# Patient Record
Sex: FEMALE | Race: BLACK OR AFRICAN AMERICAN | Employment: STUDENT | URBAN - METROPOLITAN AREA
[De-identification: names, ages, dates, MRNs, and addresses within clinical notes are randomized per-mention and may not be internally consistent; named-entity substitution may affect disease eponyms.]

---

## 2021-06-26 ENCOUNTER — HOSPITAL ENCOUNTER (EMERGENCY)
Age: 21
Discharge: HOME | End: 2021-06-26
Attending: EMERGENCY MEDICINE
Payer: COMMERCIAL

## 2021-06-26 VITALS
WEIGHT: 154 LBS | RESPIRATION RATE: 16 BRPM | TEMPERATURE: 97 F | SYSTOLIC BLOOD PRESSURE: 119 MMHG | HEART RATE: 87 BPM | DIASTOLIC BLOOD PRESSURE: 59 MMHG | OXYGEN SATURATION: 99 %

## 2021-06-26 DIAGNOSIS — R11.15 CYCLICAL VOMITING: Primary | ICD-10-CM

## 2021-06-26 PROCEDURE — 96374 THER/PROPH/DIAG INJ IV PUSH: CPT

## 2021-06-26 PROCEDURE — 99284 EMERGENCY DEPT VISIT MOD MDM: CPT

## 2021-06-26 PROCEDURE — 80053 COMPREHEN METABOLIC PANEL: CPT | Performed by: EMERGENCY MEDICINE

## 2021-06-26 PROCEDURE — 85025 COMPLETE CBC W/AUTO DIFF WBC: CPT | Performed by: EMERGENCY MEDICINE

## 2021-06-26 PROCEDURE — 96361 HYDRATE IV INFUSION ADD-ON: CPT

## 2021-06-26 PROCEDURE — 96375 TX/PRO/DX INJ NEW DRUG ADDON: CPT

## 2021-06-26 RX ORDER — ONDANSETRON 4 MG/1
4 TABLET, ORALLY DISINTEGRATING ORAL EVERY 4 HOURS PRN
Qty: 10 TABLET | Refills: 0 | Status: SHIPPED | OUTPATIENT
Start: 2021-06-26 | End: 2021-07-03

## 2021-06-26 RX ORDER — ONDANSETRON 2 MG/ML
4 INJECTION INTRAMUSCULAR; INTRAVENOUS ONCE
Status: COMPLETED | OUTPATIENT
Start: 2021-06-26 | End: 2021-06-26

## 2021-06-26 RX ORDER — ACETAMINOPHEN AND CODEINE PHOSPHATE 300; 15 MG/1; MG/1
1 TABLET ORAL EVERY 4 HOURS PRN
COMMUNITY
End: 2021-07-07 | Stop reason: ALTCHOICE

## 2021-06-26 RX ORDER — HALOPERIDOL 5 MG/ML
2.5 INJECTION INTRAMUSCULAR ONCE
Status: COMPLETED | OUTPATIENT
Start: 2021-06-26 | End: 2021-06-26

## 2021-06-26 NOTE — ED PROVIDER NOTES
Patient Seen in: THE [facility] Emergency Department In [place]      History   Patient presents with:  Nausea/Vomiting/Diarrhea    Stated Complaint: vomiting     HPI/Subjective:   HPI    This is a 59-year-old female with no significant past medical history wh guarding. EXTREMITIES: Warm with brisk capillary refill.        ED Course     Labs Reviewed   COMP METABOLIC PANEL (14) - Abnormal; Notable for the following components:       Result Value    Glucose 134 (*)     Potassium 3.3 (*)     BUN/CREA Ratio 9.3 Antony Berkowitz as well. She should follow-up with her primary care doctor. Return if worse. Patient discharged home in good condition with her mother.       Disposition and Plan     Clinical Impression:  Cyclical vomiting  (primary encounter diagnosis)     Disposition:

## 2021-06-28 ENCOUNTER — HOSPITAL ENCOUNTER (EMERGENCY)
Age: 21
Discharge: HOME | End: 2021-06-28
Attending: EMERGENCY MEDICINE
Payer: COMMERCIAL

## 2021-06-28 VITALS
RESPIRATION RATE: 14 BRPM | TEMPERATURE: 98 F | SYSTOLIC BLOOD PRESSURE: 99 MMHG | WEIGHT: 146 LBS | DIASTOLIC BLOOD PRESSURE: 48 MMHG | HEART RATE: 89 BPM | OXYGEN SATURATION: 99 %

## 2021-06-28 DIAGNOSIS — R11.15 CYCLIC VOMITING SYNDROME: Primary | ICD-10-CM

## 2021-06-28 DIAGNOSIS — E87.6 HYPOKALEMIA: ICD-10-CM

## 2021-06-28 PROCEDURE — 85025 COMPLETE CBC W/AUTO DIFF WBC: CPT

## 2021-06-28 PROCEDURE — 80053 COMPREHEN METABOLIC PANEL: CPT | Performed by: EMERGENCY MEDICINE

## 2021-06-28 PROCEDURE — 81001 URINALYSIS AUTO W/SCOPE: CPT | Performed by: EMERGENCY MEDICINE

## 2021-06-28 PROCEDURE — 99284 EMERGENCY DEPT VISIT MOD MDM: CPT

## 2021-06-28 PROCEDURE — 96375 TX/PRO/DX INJ NEW DRUG ADDON: CPT

## 2021-06-28 PROCEDURE — 81001 URINALYSIS AUTO W/SCOPE: CPT

## 2021-06-28 PROCEDURE — 80053 COMPREHEN METABOLIC PANEL: CPT

## 2021-06-28 PROCEDURE — 85025 COMPLETE CBC W/AUTO DIFF WBC: CPT | Performed by: EMERGENCY MEDICINE

## 2021-06-28 PROCEDURE — 96361 HYDRATE IV INFUSION ADD-ON: CPT

## 2021-06-28 PROCEDURE — S0028 INJECTION, FAMOTIDINE, 20 MG: HCPCS | Performed by: NURSE PRACTITIONER

## 2021-06-28 PROCEDURE — 96372 THER/PROPH/DIAG INJ SC/IM: CPT

## 2021-06-28 PROCEDURE — 96374 THER/PROPH/DIAG INJ IV PUSH: CPT

## 2021-06-28 PROCEDURE — 83690 ASSAY OF LIPASE: CPT | Performed by: NURSE PRACTITIONER

## 2021-06-28 RX ORDER — FAMOTIDINE 10 MG/ML
20 INJECTION, SOLUTION INTRAVENOUS ONCE
Status: COMPLETED | OUTPATIENT
Start: 2021-06-28 | End: 2021-06-28

## 2021-06-28 RX ORDER — DICYCLOMINE HYDROCHLORIDE 10 MG/ML
20 INJECTION INTRAMUSCULAR ONCE
Status: COMPLETED | OUTPATIENT
Start: 2021-06-28 | End: 2021-06-28

## 2021-06-28 RX ORDER — DICYCLOMINE HCL 20 MG
20 TABLET ORAL 4 TIMES DAILY PRN
Qty: 30 TABLET | Refills: 0 | Status: SHIPPED | OUTPATIENT
Start: 2021-06-28 | End: 2021-07-07 | Stop reason: ALTCHOICE

## 2021-06-28 RX ORDER — METOCLOPRAMIDE HYDROCHLORIDE 5 MG/ML
10 INJECTION INTRAMUSCULAR; INTRAVENOUS ONCE
Status: COMPLETED | OUTPATIENT
Start: 2021-06-28 | End: 2021-06-28

## 2021-06-28 RX ORDER — METOCLOPRAMIDE 10 MG/1
10 TABLET ORAL 3 TIMES DAILY PRN
Qty: 20 TABLET | Refills: 0 | Status: SHIPPED | OUTPATIENT
Start: 2021-06-28 | End: 2021-07-07

## 2021-06-28 RX ORDER — POTASSIUM CHLORIDE 20 MEQ/1
20 TABLET, EXTENDED RELEASE ORAL ONCE
Status: COMPLETED | OUTPATIENT
Start: 2021-06-28 | End: 2021-06-28

## 2021-06-28 NOTE — ED QUICK NOTES
PT given ice chips for PO challenge. Reports she has not had any episodes of emesis since arriving in the ED.

## 2021-06-28 NOTE — ED PROVIDER NOTES
Patient Seen in: THE [facility] Emergency Department In [place]      History   Patient presents with:  Abdomen/Flank Pain    Stated Complaint: vomiting , abd pain    HPI/Subjective:    This is a 20-year-old female with a history of marijuana use and cyclic vom for stated complaint: vomiting , abd pain  Other systems are as noted in HPI. Constitutional and vital signs reviewed. All other systems reviewed and negative except as noted above.     Physical Exam     ED Triage Vitals [06/28/21 1401]   /84 Phosphatase 49 (*)     Total Protein 8.4 (*)     All other components within normal limits   URINALYSIS WITH CULTURE REFLEX - Abnormal; Notable for the following components:    Ketones Urine >=160 (*)     Protein Urine 100 mg/dL (*)     All other component and a 20 MeQ of potassium p.o. She is now tolerating p.o. fluids. Likely symptoms due to cyclic vomiting syndrome or cannabis use. DC home. Rx for Bentyl and Reglan given. The importance for hydration discussed. Advance diet as tolerated.   Patient

## 2021-07-07 PROBLEM — K52.9 GASTROENTERITIS: Status: ACTIVE | Noted: 2020-04-16

## 2022-04-11 ENCOUNTER — HOSPITAL ENCOUNTER (EMERGENCY)
Age: 22
Discharge: HOME | End: 2022-04-11
Payer: COMMERCIAL

## 2022-04-11 VITALS
SYSTOLIC BLOOD PRESSURE: 134 MMHG | RESPIRATION RATE: 18 BRPM | DIASTOLIC BLOOD PRESSURE: 86 MMHG | HEART RATE: 85 BPM | OXYGEN SATURATION: 100 %

## 2022-04-11 VITALS
HEART RATE: 85 BPM | OXYGEN SATURATION: 100 % | DIASTOLIC BLOOD PRESSURE: 67 MMHG | RESPIRATION RATE: 18 BRPM | SYSTOLIC BLOOD PRESSURE: 115 MMHG

## 2022-04-11 VITALS
DIASTOLIC BLOOD PRESSURE: 94 MMHG | SYSTOLIC BLOOD PRESSURE: 112 MMHG | RESPIRATION RATE: 18 BRPM | HEART RATE: 78 BPM | OXYGEN SATURATION: 100 %

## 2022-04-11 VITALS
DIASTOLIC BLOOD PRESSURE: 92 MMHG | TEMPERATURE: 98.06 F | RESPIRATION RATE: 16 BRPM | HEART RATE: 88 BPM | SYSTOLIC BLOOD PRESSURE: 120 MMHG

## 2022-04-11 DIAGNOSIS — R10.13: Primary | ICD-10-CM

## 2022-04-11 DIAGNOSIS — R73.9: ICD-10-CM

## 2022-04-11 LAB
ADD MANUAL DIFF: NO
ALANINE AMINOTRANSFERASE: 20 U/L (ref 4–35)
ALBUMIN LEVEL: 4.4 G/DL (ref 3.5–5.1)
ALBUMIN SPEC-MCNC: 4.4 G/DL (ref 3.5–5.1)
ALKALINE PHOSPHATASE: 68 U/L (ref 38–126)
ALP SERPL-CCNC: 68 U/L (ref 38–126)
ALT SERPL-CCNC: 20 U/L (ref 4–35)
ANION GAP: 12 MMOL/L (ref 8–16)
ASPARTATE AMINO TRANSFERASE: 33 U/L (ref 14–36)
AST SERPL-CCNC: 33 U/L (ref 14–36)
BASIC METABOLIC AND HEMATOCRIT PNL BLD: 36.7 % (ref 37–47)
BILIRUBIN,TOTAL: 0.2 MG/DL (ref 0.2–1.3)
BLOOD UREA NITROGEN: 7 MG/DL (ref 7–17)
BUN SERPL-MCNC: 7 MG/DL (ref 7–17)
CALCIUM: 9.2 MG/DL (ref 8.4–10.2)
CARBON DIOXIDE: 18 MMOL/L (ref 22–30)
CHLORIDE SPEC-MCNC: 108 MMOL/L (ref 98–107)
CHLORIDE: 108 MMOL/L (ref 98–107)
CREAT CL PREDICTED SERPL C-G-VRATE: 99 ML/MIN
ESTIMATED CRCL CALCULATION: 99 ML/MIN
ESTIMATED GLOMERULAR FILT RATE: > 60
GFRSERPLBLD MDRD-ARVRAT: > 60 ML/MIN/{1.73_M2}
GLUCOSE SERPL-MCNC: 173 MG/DL (ref 65–110)
GLUCOSE URINE UA: (no result) MG/DL
GLUCOSE: 173 MG/DL (ref 65–110)
HEMATOCRIT: 36.7 % (ref 37–47)
HEMOGLOBIN: 12.5 G/DL (ref 12–15)
HGB+HCT PNL BLD: 36.7 % (ref 37–47)
IMM GRANULOCYTES NFR BLD AUTO: 0.3 % (ref 0–0.5)
IPF CHARGE: (no result)
LIPASE SERPL-CCNC: 34 U/L (ref 23–300)
LIPASE: 34 U/L (ref 23–300)
LYMPHOCYTES # SPEC AUTO: 1.01 K/MM3 (ref 0.9–3.2)
MCV RBC: 88.2 FL (ref 80–100)
MEAN CORPUSCULAR HEMOGLOBIN: 30 PG (ref 26–34)
MEAN CORPUSCULAR HGB CONC: 34.1 G/DL (ref 32–36)
MICRO URNS: NO
NUCLEATED RED BLOOD CELLS ABSOLUTE AUTO: 0 K/MM3 (ref 0–0.01)
NUCLEATED RED BLOOD CELLS PERC: 0 % (ref 0–0.2)
PLATELET COUNT RESULT: 335 K/MM3 (ref 150–375)
POTASSIUM: 3.8 MMOL/L (ref 3.4–5)
PROT SERPL-MCNC: 8 G/DL (ref 6.3–8.2)
RED BLOOD COUNT: 4.16 M/MM3 (ref 4.2–5.4)
SODIUM: 138 MMOL/L (ref 137–145)
SP GR UR: >= 1.03 (ref 1–1.03)
TOTAL PROTEIN: 8 G/DL (ref 6.3–8.2)
WHITE BLOOD COUNT: 5.8 K/MM3 (ref 4.5–10)

## 2022-04-11 PROCEDURE — 85025 COMPLETE CBC W/AUTO DIFF WBC: CPT

## 2022-04-11 PROCEDURE — 80053 COMPREHEN METABOLIC PANEL: CPT

## 2022-04-11 PROCEDURE — 96361 HYDRATE IV INFUSION ADD-ON: CPT

## 2022-04-11 PROCEDURE — 81003 URINALYSIS AUTO W/O SCOPE: CPT

## 2022-04-11 PROCEDURE — 81025 URINE PREGNANCY TEST: CPT

## 2022-04-11 PROCEDURE — 99284 EMERGENCY DEPT VISIT MOD MDM: CPT

## 2022-04-11 PROCEDURE — 96375 TX/PRO/DX INJ NEW DRUG ADDON: CPT

## 2022-04-11 PROCEDURE — 36415 COLL VENOUS BLD VENIPUNCTURE: CPT

## 2022-04-11 PROCEDURE — 96374 THER/PROPH/DIAG INJ IV PUSH: CPT

## 2022-04-11 PROCEDURE — 83690 ASSAY OF LIPASE: CPT

## 2022-08-06 ENCOUNTER — OFFICE VISIT (OUTPATIENT)
Dept: INTERNAL MEDICINE CLINIC | Facility: CLINIC | Age: 22
End: 2022-08-06
Payer: COMMERCIAL

## 2022-08-06 ENCOUNTER — LAB ENCOUNTER (OUTPATIENT)
Dept: LAB | Age: 22
End: 2022-08-06
Attending: PHYSICIAN ASSISTANT
Payer: COMMERCIAL

## 2022-08-06 VITALS
WEIGHT: 159 LBS | HEIGHT: 66.14 IN | HEART RATE: 82 BPM | OXYGEN SATURATION: 96 % | BODY MASS INDEX: 25.55 KG/M2 | SYSTOLIC BLOOD PRESSURE: 100 MMHG | RESPIRATION RATE: 16 BRPM | TEMPERATURE: 100 F | DIASTOLIC BLOOD PRESSURE: 60 MMHG

## 2022-08-06 DIAGNOSIS — R23.3 EASY BRUISING: ICD-10-CM

## 2022-08-06 DIAGNOSIS — Z00.00 ANNUAL PHYSICAL EXAM: Primary | ICD-10-CM

## 2022-08-06 DIAGNOSIS — Z30.015 ENCOUNTER FOR INITIAL PRESCRIPTION OF VAGINAL RING HORMONAL CONTRACEPTIVE: ICD-10-CM

## 2022-08-06 DIAGNOSIS — Z11.3 SCREEN FOR STD (SEXUALLY TRANSMITTED DISEASE): ICD-10-CM

## 2022-08-06 DIAGNOSIS — R21 RASH: ICD-10-CM

## 2022-08-06 DIAGNOSIS — Z00.00 ANNUAL PHYSICAL EXAM: ICD-10-CM

## 2022-08-06 LAB
ANION GAP SERPL CALC-SCNC: 3 MMOL/L (ref 0–18)
BASOPHILS # BLD AUTO: 0.03 X10(3) UL (ref 0–0.2)
BASOPHILS NFR BLD AUTO: 0.7 %
BUN BLD-MCNC: 14 MG/DL (ref 7–18)
CALCIUM BLD-MCNC: 8.9 MG/DL (ref 8.5–10.1)
CHLORIDE SERPL-SCNC: 111 MMOL/L (ref 98–112)
CO2 SERPL-SCNC: 27 MMOL/L (ref 21–32)
CREAT BLD-MCNC: 0.84 MG/DL
DEPRECATED HBV CORE AB SER IA-ACNC: 23.5 NG/ML
EOSINOPHIL # BLD AUTO: 0.07 X10(3) UL (ref 0–0.7)
EOSINOPHIL NFR BLD AUTO: 1.6 %
ERYTHROCYTE [DISTWIDTH] IN BLOOD BY AUTOMATED COUNT: 13.2 %
GFR SERPLBLD BASED ON 1.73 SQ M-ARVRAT: 101 ML/MIN/1.73M2 (ref 60–?)
GLUCOSE BLD-MCNC: 92 MG/DL (ref 70–99)
HCT VFR BLD AUTO: 37.7 %
HCV AB SERPL QL IA: NONREACTIVE
HGB BLD-MCNC: 11.9 G/DL
IMM GRANULOCYTES # BLD AUTO: 0.01 X10(3) UL (ref 0–1)
IMM GRANULOCYTES NFR BLD: 0.2 %
LYMPHOCYTES # BLD AUTO: 2.03 X10(3) UL (ref 1–4)
LYMPHOCYTES NFR BLD AUTO: 47.4 %
MCH RBC QN AUTO: 29.8 PG (ref 26–34)
MCHC RBC AUTO-ENTMCNC: 31.6 G/DL (ref 31–37)
MCV RBC AUTO: 94.5 FL
MONOCYTES # BLD AUTO: 0.47 X10(3) UL (ref 0.1–1)
MONOCYTES NFR BLD AUTO: 11 %
NEUTROPHILS # BLD AUTO: 1.67 X10 (3) UL (ref 1.5–7.7)
NEUTROPHILS # BLD AUTO: 1.67 X10(3) UL (ref 1.5–7.7)
NEUTROPHILS NFR BLD AUTO: 39.1 %
OSMOLALITY SERPL CALC.SUM OF ELEC: 292 MOSM/KG (ref 275–295)
PLATELET # BLD AUTO: 283 10(3)UL (ref 150–450)
POTASSIUM SERPL-SCNC: 4.3 MMOL/L (ref 3.5–5.1)
RBC # BLD AUTO: 3.99 X10(6)UL
SODIUM SERPL-SCNC: 141 MMOL/L (ref 136–145)
TSI SER-ACNC: 1.81 MIU/ML (ref 0.36–3.74)
WBC # BLD AUTO: 4.3 X10(3) UL (ref 4–11)

## 2022-08-06 PROCEDURE — 87591 N.GONORRHOEAE DNA AMP PROB: CPT

## 2022-08-06 PROCEDURE — 87491 CHLMYD TRACH DNA AMP PROBE: CPT

## 2022-08-06 PROCEDURE — 86803 HEPATITIS C AB TEST: CPT

## 2022-08-06 PROCEDURE — 84443 ASSAY THYROID STIM HORMONE: CPT

## 2022-08-06 PROCEDURE — 85025 COMPLETE CBC W/AUTO DIFF WBC: CPT

## 2022-08-06 PROCEDURE — 82728 ASSAY OF FERRITIN: CPT

## 2022-08-06 PROCEDURE — 80048 BASIC METABOLIC PNL TOTAL CA: CPT

## 2022-08-06 PROCEDURE — 36415 COLL VENOUS BLD VENIPUNCTURE: CPT

## 2022-08-06 RX ORDER — TRIAMCINOLONE ACETONIDE 1 MG/G
CREAM TOPICAL 2 TIMES DAILY PRN
Qty: 30 G | Refills: 0 | Status: SHIPPED | OUTPATIENT
Start: 2022-08-06

## 2022-08-06 RX ORDER — ETONOGESTREL AND ETHINYL ESTRADIOL 11.7; 2.7 MG/1; MG/1
INSERT, EXTENDED RELEASE VAGINAL
Qty: 3 RING | Refills: 3 | Status: SHIPPED | OUTPATIENT
Start: 2022-08-06

## 2022-08-06 RX ORDER — GARLIC EXTRACT 500 MG
1 CAPSULE ORAL DAILY
COMMUNITY

## 2022-08-06 NOTE — PATIENT INSTRUCTIONS
Blood work today.     Rash:  - start triamcinolone cream - thin layer twice a day x 10 days  - use an OTC moisturizing lotion such as Cetaphil, Cerave, or Eucerin cream  - if no improvement please see dermatology    Gynecology:  Dr. Jose Clarke [place], [facility]  Phone: [phone]    or    Dr. Jacobo Mijares [address]  [place], [facility]  Phone: [phone]

## 2022-08-08 LAB
C TRACH DNA SPEC QL NAA+PROBE: NEGATIVE
N GONORRHOEA DNA SPEC QL NAA+PROBE: NEGATIVE

## 2022-09-21 ENCOUNTER — TELEMEDICINE (OUTPATIENT)
Dept: INTERNAL MEDICINE CLINIC | Facility: CLINIC | Age: 22
End: 2022-09-21

## 2022-09-21 DIAGNOSIS — Z53.29 NO-SHOW FOR APPOINTMENT: Primary | ICD-10-CM

## 2022-09-21 NOTE — PROGRESS NOTES
Called patient at appointment time, she was on train. Called back at 1:00 pm, no answer. Will need to reschedule.

## 2022-10-04 ENCOUNTER — TELEMEDICINE (OUTPATIENT)
Dept: INTERNAL MEDICINE CLINIC | Facility: CLINIC | Age: 22
End: 2022-10-04
Payer: COMMERCIAL

## 2022-10-04 DIAGNOSIS — D50.0 IRON DEFICIENCY ANEMIA DUE TO CHRONIC BLOOD LOSS: Primary | ICD-10-CM

## 2022-10-04 DIAGNOSIS — R68.89 HEAT INTOLERANCE: ICD-10-CM

## 2022-10-04 DIAGNOSIS — Z30.44 ENCOUNTER FOR SURVEILLANCE OF VAGINAL RING HORMONAL CONTRACEPTIVE DEVICE: ICD-10-CM

## 2022-10-04 DIAGNOSIS — J30.9 ALLERGIC RHINITIS, UNSPECIFIED SEASONALITY, UNSPECIFIED TRIGGER: ICD-10-CM

## 2022-10-04 PROCEDURE — 99214 OFFICE O/P EST MOD 30 MIN: CPT | Performed by: PHYSICIAN ASSISTANT

## 2022-10-04 RX ORDER — AZELASTINE 1 MG/ML
2 SPRAY, METERED NASAL 2 TIMES DAILY
Qty: 30 ML | Refills: 5 | Status: SHIPPED | OUTPATIENT
Start: 2022-10-04

## 2022-10-04 RX ORDER — FERROUS SULFATE 325(65) MG
325 TABLET ORAL
Refills: 0 | COMMUNITY
Start: 2022-10-04

## 2022-12-19 ENCOUNTER — OFFICE VISIT (OUTPATIENT)
Dept: INTERNAL MEDICINE CLINIC | Facility: CLINIC | Age: 22
End: 2022-12-19
Payer: COMMERCIAL

## 2022-12-19 ENCOUNTER — LAB ENCOUNTER (OUTPATIENT)
Dept: LAB | Age: 22
End: 2022-12-19
Attending: PHYSICIAN ASSISTANT
Payer: COMMERCIAL

## 2022-12-19 VITALS
RESPIRATION RATE: 16 BRPM | HEART RATE: 73 BPM | BODY MASS INDEX: 27.7 KG/M2 | WEIGHT: 172.38 LBS | TEMPERATURE: 99 F | DIASTOLIC BLOOD PRESSURE: 70 MMHG | SYSTOLIC BLOOD PRESSURE: 112 MMHG | HEIGHT: 66.14 IN | OXYGEN SATURATION: 100 %

## 2022-12-19 DIAGNOSIS — D50.0 IRON DEFICIENCY ANEMIA DUE TO CHRONIC BLOOD LOSS: ICD-10-CM

## 2022-12-19 DIAGNOSIS — L81.9 HYPERPIGMENTATION OF SKIN: ICD-10-CM

## 2022-12-19 DIAGNOSIS — D50.0 IRON DEFICIENCY ANEMIA DUE TO CHRONIC BLOOD LOSS: Primary | ICD-10-CM

## 2022-12-19 DIAGNOSIS — N92.0 MENORRHAGIA WITH REGULAR CYCLE: ICD-10-CM

## 2022-12-19 LAB
BASOPHILS # BLD AUTO: 0.05 X10(3) UL (ref 0–0.2)
BASOPHILS NFR BLD AUTO: 0.9 %
DEPRECATED HBV CORE AB SER IA-ACNC: 29.1 NG/ML
EOSINOPHIL # BLD AUTO: 0.04 X10(3) UL (ref 0–0.7)
EOSINOPHIL NFR BLD AUTO: 0.7 %
ERYTHROCYTE [DISTWIDTH] IN BLOOD BY AUTOMATED COUNT: 13 %
HCT VFR BLD AUTO: 38.9 %
HGB BLD-MCNC: 12.8 G/DL
IMM GRANULOCYTES # BLD AUTO: 0.03 X10(3) UL (ref 0–1)
IMM GRANULOCYTES NFR BLD: 0.5 %
IRON SATN MFR SERPL: 17 %
IRON SERPL-MCNC: 82 UG/DL
LYMPHOCYTES # BLD AUTO: 2.2 X10(3) UL (ref 1–4)
LYMPHOCYTES NFR BLD AUTO: 38.7 %
MCH RBC QN AUTO: 30.8 PG (ref 26–34)
MCHC RBC AUTO-ENTMCNC: 32.9 G/DL (ref 31–37)
MCV RBC AUTO: 93.5 FL
MONOCYTES # BLD AUTO: 0.48 X10(3) UL (ref 0.1–1)
MONOCYTES NFR BLD AUTO: 8.5 %
NEUTROPHILS # BLD AUTO: 2.88 X10 (3) UL (ref 1.5–7.7)
NEUTROPHILS # BLD AUTO: 2.88 X10(3) UL (ref 1.5–7.7)
NEUTROPHILS NFR BLD AUTO: 50.7 %
PLATELET # BLD AUTO: 342 10(3)UL (ref 150–450)
RBC # BLD AUTO: 4.16 X10(6)UL
TIBC SERPL-MCNC: 480 UG/DL (ref 240–450)
TRANSFERRIN SERPL-MCNC: 322 MG/DL (ref 200–360)
WBC # BLD AUTO: 5.7 X10(3) UL (ref 4–11)

## 2022-12-19 PROCEDURE — 83550 IRON BINDING TEST: CPT | Performed by: PHYSICIAN ASSISTANT

## 2022-12-19 PROCEDURE — 99214 OFFICE O/P EST MOD 30 MIN: CPT | Performed by: PHYSICIAN ASSISTANT

## 2022-12-19 PROCEDURE — 3074F SYST BP LT 130 MM HG: CPT | Performed by: PHYSICIAN ASSISTANT

## 2022-12-19 PROCEDURE — 82728 ASSAY OF FERRITIN: CPT | Performed by: PHYSICIAN ASSISTANT

## 2022-12-19 PROCEDURE — 83540 ASSAY OF IRON: CPT | Performed by: PHYSICIAN ASSISTANT

## 2022-12-19 PROCEDURE — 3078F DIAST BP <80 MM HG: CPT | Performed by: PHYSICIAN ASSISTANT

## 2022-12-19 PROCEDURE — 3008F BODY MASS INDEX DOCD: CPT | Performed by: PHYSICIAN ASSISTANT

## 2022-12-19 PROCEDURE — 85025 COMPLETE CBC W/AUTO DIFF WBC: CPT | Performed by: PHYSICIAN ASSISTANT

## 2022-12-19 RX ORDER — LEVOCETIRIZINE DIHYDROCHLORIDE 5 MG/1
5 TABLET, FILM COATED ORAL EVERY EVENING
COMMUNITY

## 2022-12-19 NOTE — PATIENT INSTRUCTIONS
Blood work today. If iron still low please follow up with hematology for iron infusion. See dermatology for full skin check.

## 2023-08-08 ENCOUNTER — LAB ENCOUNTER (OUTPATIENT)
Dept: LAB | Age: 23
End: 2023-08-08
Attending: PHYSICIAN ASSISTANT
Payer: MEDICAID

## 2023-08-08 ENCOUNTER — OFFICE VISIT (OUTPATIENT)
Dept: INTERNAL MEDICINE CLINIC | Facility: CLINIC | Age: 23
End: 2023-08-08
Payer: MEDICAID

## 2023-08-08 VITALS
BODY MASS INDEX: 31.12 KG/M2 | HEART RATE: 77 BPM | TEMPERATURE: 98 F | DIASTOLIC BLOOD PRESSURE: 52 MMHG | HEIGHT: 66 IN | SYSTOLIC BLOOD PRESSURE: 96 MMHG | RESPIRATION RATE: 12 BRPM | WEIGHT: 193.63 LBS | OXYGEN SATURATION: 98 %

## 2023-08-08 DIAGNOSIS — Z11.3 SCREEN FOR STD (SEXUALLY TRANSMITTED DISEASE): ICD-10-CM

## 2023-08-08 DIAGNOSIS — N92.0 MENORRHAGIA WITH REGULAR CYCLE: ICD-10-CM

## 2023-08-08 DIAGNOSIS — Z86.39 HISTORY OF IRON DEFICIENCY: ICD-10-CM

## 2023-08-08 DIAGNOSIS — Z00.00 ANNUAL PHYSICAL EXAM: Primary | ICD-10-CM

## 2023-08-08 DIAGNOSIS — Z00.00 ANNUAL PHYSICAL EXAM: ICD-10-CM

## 2023-08-08 PROBLEM — K52.9 GASTROENTERITIS: Status: RESOLVED | Noted: 2020-04-16 | Resolved: 2023-08-08

## 2023-08-08 LAB
ANION GAP SERPL CALC-SCNC: 4 MMOL/L (ref 0–18)
BASOPHILS # BLD AUTO: 0.04 X10(3) UL (ref 0–0.2)
BASOPHILS NFR BLD AUTO: 0.7 %
BUN BLD-MCNC: 13 MG/DL (ref 7–18)
CALCIUM BLD-MCNC: 8.8 MG/DL (ref 8.5–10.1)
CHLORIDE SERPL-SCNC: 105 MMOL/L (ref 98–112)
CHOLEST SERPL-MCNC: 177 MG/DL (ref ?–200)
CO2 SERPL-SCNC: 28 MMOL/L (ref 21–32)
CREAT BLD-MCNC: 0.98 MG/DL
DEPRECATED HBV CORE AB SER IA-ACNC: 30 NG/ML
EGFRCR SERPLBLD CKD-EPI 2021: 83 ML/MIN/1.73M2 (ref 60–?)
EOSINOPHIL # BLD AUTO: 0.07 X10(3) UL (ref 0–0.7)
EOSINOPHIL NFR BLD AUTO: 1.2 %
ERYTHROCYTE [DISTWIDTH] IN BLOOD BY AUTOMATED COUNT: 13 %
FASTING PATIENT LIPID ANSWER: YES
FASTING STATUS PATIENT QL REPORTED: YES
GLUCOSE BLD-MCNC: 92 MG/DL (ref 70–99)
HBV SURFACE AG SER-ACNC: <0.1 [IU]/L
HBV SURFACE AG SERPL QL IA: NONREACTIVE
HCG UR QL: NEGATIVE
HCT VFR BLD AUTO: 39.7 %
HCV AB SERPL QL IA: NONREACTIVE
HDLC SERPL-MCNC: 77 MG/DL (ref 40–59)
HGB BLD-MCNC: 13 G/DL
IMM GRANULOCYTES # BLD AUTO: 0.02 X10(3) UL (ref 0–1)
IMM GRANULOCYTES NFR BLD: 0.3 %
LDLC SERPL CALC-MCNC: 92 MG/DL (ref ?–100)
LYMPHOCYTES # BLD AUTO: 2.87 X10(3) UL (ref 1–4)
LYMPHOCYTES NFR BLD AUTO: 48.5 %
MCH RBC QN AUTO: 29.6 PG (ref 26–34)
MCHC RBC AUTO-ENTMCNC: 32.7 G/DL (ref 31–37)
MCV RBC AUTO: 90.4 FL
MONOCYTES # BLD AUTO: 0.54 X10(3) UL (ref 0.1–1)
MONOCYTES NFR BLD AUTO: 9.1 %
NEUTROPHILS # BLD AUTO: 2.38 X10 (3) UL (ref 1.5–7.7)
NEUTROPHILS # BLD AUTO: 2.38 X10(3) UL (ref 1.5–7.7)
NEUTROPHILS NFR BLD AUTO: 40.2 %
NONHDLC SERPL-MCNC: 100 MG/DL (ref ?–130)
OSMOLALITY SERPL CALC.SUM OF ELEC: 284 MOSM/KG (ref 275–295)
PLATELET # BLD AUTO: 322 10(3)UL (ref 150–450)
POTASSIUM SERPL-SCNC: 3.5 MMOL/L (ref 3.5–5.1)
RBC # BLD AUTO: 4.39 X10(6)UL
SODIUM SERPL-SCNC: 137 MMOL/L (ref 136–145)
T PALLIDUM AB SER QL IA: NONREACTIVE
TRIGL SERPL-MCNC: 37 MG/DL (ref 30–149)
TSI SER-ACNC: 2.88 MIU/ML (ref 0.36–3.74)
VLDLC SERPL CALC-MCNC: 6 MG/DL (ref 0–30)
WBC # BLD AUTO: 5.9 X10(3) UL (ref 4–11)

## 2023-08-08 PROCEDURE — 82728 ASSAY OF FERRITIN: CPT

## 2023-08-08 PROCEDURE — 85025 COMPLETE CBC W/AUTO DIFF WBC: CPT

## 2023-08-08 PROCEDURE — 99395 PREV VISIT EST AGE 18-39: CPT | Performed by: PHYSICIAN ASSISTANT

## 2023-08-08 PROCEDURE — 84443 ASSAY THYROID STIM HORMONE: CPT

## 2023-08-08 PROCEDURE — 36415 COLL VENOUS BLD VENIPUNCTURE: CPT

## 2023-08-08 PROCEDURE — 86803 HEPATITIS C AB TEST: CPT

## 2023-08-08 PROCEDURE — 80048 BASIC METABOLIC PNL TOTAL CA: CPT

## 2023-08-08 PROCEDURE — 87591 N.GONORRHOEAE DNA AMP PROB: CPT

## 2023-08-08 PROCEDURE — 80061 LIPID PANEL: CPT

## 2023-08-08 PROCEDURE — 86780 TREPONEMA PALLIDUM: CPT

## 2023-08-08 PROCEDURE — 87389 HIV-1 AG W/HIV-1&-2 AB AG IA: CPT

## 2023-08-08 PROCEDURE — 3078F DIAST BP <80 MM HG: CPT | Performed by: PHYSICIAN ASSISTANT

## 2023-08-08 PROCEDURE — 87491 CHLMYD TRACH DNA AMP PROBE: CPT

## 2023-08-08 PROCEDURE — 81025 URINE PREGNANCY TEST: CPT

## 2023-08-08 PROCEDURE — 3008F BODY MASS INDEX DOCD: CPT | Performed by: PHYSICIAN ASSISTANT

## 2023-08-08 PROCEDURE — 3074F SYST BP LT 130 MM HG: CPT | Performed by: PHYSICIAN ASSISTANT

## 2023-08-08 PROCEDURE — 87340 HEPATITIS B SURFACE AG IA: CPT

## 2023-08-08 RX ORDER — ETONOGESTREL AND ETHINYL ESTRADIOL 11.7; 2.7 MG/1; MG/1
INSERT, EXTENDED RELEASE VAGINAL
Qty: 3 RING | Refills: 3 | Status: SHIPPED | OUTPATIENT
Start: 2023-08-08

## 2023-08-08 NOTE — PATIENT INSTRUCTIONS
Blood work and urine test today. Flu shot in October. Please focus on a diet consisting of lean or plant based proteins, fruits, veggies, and whole grains, as well as regular exercise (30 minutes daily).

## 2023-08-09 LAB
C TRACH DNA SPEC QL NAA+PROBE: NEGATIVE
N GONORRHOEA DNA SPEC QL NAA+PROBE: NEGATIVE

## 2023-09-06 ENCOUNTER — LAB ENCOUNTER (OUTPATIENT)
Dept: LAB | Age: 23
End: 2023-09-06
Attending: INTERNAL MEDICINE
Payer: MEDICAID

## 2023-09-06 ENCOUNTER — OFFICE VISIT (OUTPATIENT)
Dept: INTERNAL MEDICINE CLINIC | Facility: CLINIC | Age: 23
End: 2023-09-06
Payer: MEDICAID

## 2023-09-06 VITALS
SYSTOLIC BLOOD PRESSURE: 98 MMHG | HEIGHT: 66 IN | OXYGEN SATURATION: 96 % | WEIGHT: 194.63 LBS | DIASTOLIC BLOOD PRESSURE: 72 MMHG | BODY MASS INDEX: 31.28 KG/M2 | HEART RATE: 79 BPM | RESPIRATION RATE: 16 BRPM | TEMPERATURE: 98 F

## 2023-09-06 DIAGNOSIS — Z11.3 SCREENING FOR STD (SEXUALLY TRANSMITTED DISEASE): ICD-10-CM

## 2023-09-06 DIAGNOSIS — Z11.3 SCREENING FOR STD (SEXUALLY TRANSMITTED DISEASE): Primary | ICD-10-CM

## 2023-09-06 LAB
HBV SURFACE AG SER-ACNC: <0.1 [IU]/L
HBV SURFACE AG SERPL QL IA: NONREACTIVE
HCV AB SERPL QL IA: NONREACTIVE
T PALLIDUM AB SER QL IA: NONREACTIVE

## 2023-09-06 PROCEDURE — 86780 TREPONEMA PALLIDUM: CPT

## 2023-09-06 PROCEDURE — 87340 HEPATITIS B SURFACE AG IA: CPT

## 2023-09-06 PROCEDURE — 87591 N.GONORRHOEAE DNA AMP PROB: CPT

## 2023-09-06 PROCEDURE — 86803 HEPATITIS C AB TEST: CPT

## 2023-09-06 PROCEDURE — 3008F BODY MASS INDEX DOCD: CPT | Performed by: INTERNAL MEDICINE

## 2023-09-06 PROCEDURE — 3074F SYST BP LT 130 MM HG: CPT | Performed by: INTERNAL MEDICINE

## 2023-09-06 PROCEDURE — 36415 COLL VENOUS BLD VENIPUNCTURE: CPT

## 2023-09-06 PROCEDURE — 87491 CHLMYD TRACH DNA AMP PROBE: CPT

## 2023-09-06 PROCEDURE — 87389 HIV-1 AG W/HIV-1&-2 AB AG IA: CPT

## 2023-09-06 PROCEDURE — 3078F DIAST BP <80 MM HG: CPT | Performed by: INTERNAL MEDICINE

## 2023-09-06 PROCEDURE — 99213 OFFICE O/P EST LOW 20 MIN: CPT | Performed by: INTERNAL MEDICINE

## 2023-09-06 RX ORDER — AZELASTINE 1 MG/ML
1 SPRAY, METERED NASAL 2 TIMES DAILY
COMMUNITY
Start: 2023-08-31

## 2023-09-07 LAB
C TRACH DNA SPEC QL NAA+PROBE: NEGATIVE
N GONORRHOEA DNA SPEC QL NAA+PROBE: NEGATIVE

## 2023-10-26 ENCOUNTER — LAB ENCOUNTER (OUTPATIENT)
Dept: LAB | Age: 23
End: 2023-10-26
Attending: INTERNAL MEDICINE

## 2023-10-26 ENCOUNTER — OFFICE VISIT (OUTPATIENT)
Dept: INTERNAL MEDICINE CLINIC | Facility: CLINIC | Age: 23
End: 2023-10-26

## 2023-10-26 VITALS
SYSTOLIC BLOOD PRESSURE: 98 MMHG | WEIGHT: 194.19 LBS | DIASTOLIC BLOOD PRESSURE: 76 MMHG | RESPIRATION RATE: 18 BRPM | HEIGHT: 66 IN | HEART RATE: 78 BPM | OXYGEN SATURATION: 96 % | BODY MASS INDEX: 31.21 KG/M2 | TEMPERATURE: 98 F

## 2023-10-26 DIAGNOSIS — N92.6 MISSED PERIOD: ICD-10-CM

## 2023-10-26 DIAGNOSIS — Z11.3 SCREEN FOR STD (SEXUALLY TRANSMITTED DISEASE): Primary | ICD-10-CM

## 2023-10-26 DIAGNOSIS — Z11.3 SCREEN FOR STD (SEXUALLY TRANSMITTED DISEASE): ICD-10-CM

## 2023-10-26 LAB — B-HCG SERPL-ACNC: <1 MIU/ML

## 2023-10-26 PROCEDURE — 87491 CHLMYD TRACH DNA AMP PROBE: CPT

## 2023-10-26 PROCEDURE — 3074F SYST BP LT 130 MM HG: CPT | Performed by: INTERNAL MEDICINE

## 2023-10-26 PROCEDURE — 87340 HEPATITIS B SURFACE AG IA: CPT

## 2023-10-26 PROCEDURE — 86780 TREPONEMA PALLIDUM: CPT

## 2023-10-26 PROCEDURE — 36415 COLL VENOUS BLD VENIPUNCTURE: CPT

## 2023-10-26 PROCEDURE — 87591 N.GONORRHOEAE DNA AMP PROB: CPT

## 2023-10-26 PROCEDURE — 86803 HEPATITIS C AB TEST: CPT

## 2023-10-26 PROCEDURE — 99213 OFFICE O/P EST LOW 20 MIN: CPT | Performed by: INTERNAL MEDICINE

## 2023-10-26 PROCEDURE — 3008F BODY MASS INDEX DOCD: CPT | Performed by: INTERNAL MEDICINE

## 2023-10-26 PROCEDURE — 87389 HIV-1 AG W/HIV-1&-2 AB AG IA: CPT

## 2023-10-26 PROCEDURE — 3078F DIAST BP <80 MM HG: CPT | Performed by: INTERNAL MEDICINE

## 2023-10-26 PROCEDURE — 84702 CHORIONIC GONADOTROPIN TEST: CPT

## 2023-10-27 LAB
C TRACH DNA SPEC QL NAA+PROBE: NEGATIVE
HCV AB SERPL QL IA: NONREACTIVE
N GONORRHOEA DNA SPEC QL NAA+PROBE: NEGATIVE
T PALLIDUM AB SER QL IA: NONREACTIVE

## 2023-10-28 LAB
HBV SURFACE AG SER-ACNC: <0.1 [IU]/L
HBV SURFACE AG SERPL QL IA: NONREACTIVE

## 2023-11-05 ENCOUNTER — HOSPITAL ENCOUNTER (EMERGENCY)
Age: 23
Discharge: HOME | End: 2023-11-05
Attending: EMERGENCY MEDICINE
Payer: MEDICAID

## 2023-11-05 VITALS
BODY MASS INDEX: 30.86 KG/M2 | DIASTOLIC BLOOD PRESSURE: 67 MMHG | RESPIRATION RATE: 16 BRPM | WEIGHT: 192 LBS | HEART RATE: 81 BPM | OXYGEN SATURATION: 99 % | HEIGHT: 66 IN | TEMPERATURE: 98 F | SYSTOLIC BLOOD PRESSURE: 116 MMHG

## 2023-11-05 DIAGNOSIS — S09.90XA INJURY OF HEAD, INITIAL ENCOUNTER: Primary | ICD-10-CM

## 2023-11-05 PROCEDURE — 99284 EMERGENCY DEPT VISIT MOD MDM: CPT

## 2023-11-05 PROCEDURE — 99283 EMERGENCY DEPT VISIT LOW MDM: CPT

## 2023-11-05 RX ORDER — ACETAMINOPHEN 500 MG
1000 TABLET ORAL ONCE
Status: COMPLETED | OUTPATIENT
Start: 2023-11-05 | End: 2023-11-05

## 2023-11-06 NOTE — ED INITIAL ASSESSMENT (HPI)
Patient presents right sided headache s/p hitting head at work thursday night. States she was bending down and upon standing hit the right side of her head on metal box. Denes LOC. Denies N/v and visual disturbances. Denies blood thinners.

## 2023-12-22 ENCOUNTER — HOSPITAL ENCOUNTER (OUTPATIENT)
Age: 23
Discharge: HOME | End: 2023-12-22

## 2023-12-22 VITALS
TEMPERATURE: 100 F | HEART RATE: 102 BPM | SYSTOLIC BLOOD PRESSURE: 114 MMHG | OXYGEN SATURATION: 99 % | DIASTOLIC BLOOD PRESSURE: 72 MMHG | RESPIRATION RATE: 16 BRPM

## 2023-12-22 DIAGNOSIS — U07.1 COVID-19 VIRUS INFECTION: Primary | ICD-10-CM

## 2023-12-22 LAB
POCT INFLUENZA A: NEGATIVE
POCT INFLUENZA B: NEGATIVE
SARS-COV-2 RNA RESP QL NAA+PROBE: DETECTED

## 2023-12-22 PROCEDURE — 87502 INFLUENZA DNA AMP PROBE: CPT | Performed by: NURSE PRACTITIONER

## 2023-12-22 PROCEDURE — 99213 OFFICE O/P EST LOW 20 MIN: CPT

## 2023-12-22 PROCEDURE — 99212 OFFICE O/P EST SF 10 MIN: CPT

## 2023-12-22 PROCEDURE — 87651 STREP A DNA AMP PROBE: CPT | Performed by: NURSE PRACTITIONER

## 2023-12-22 NOTE — ED INITIAL ASSESSMENT (HPI)
Headache started yesterday. Takes tylenol and motrin . Last dose   9pm . Today took dayquil .  Denies fever

## 2024-05-09 ENCOUNTER — HOSPITAL ENCOUNTER (EMERGENCY)
Age: 24
Discharge: HOME | End: 2024-05-09
Attending: EMERGENCY MEDICINE

## 2024-05-09 ENCOUNTER — APPOINTMENT (OUTPATIENT)
Dept: CT IMAGING | Age: 24
End: 2024-05-09
Attending: EMERGENCY MEDICINE

## 2024-05-09 VITALS
OXYGEN SATURATION: 98 % | DIASTOLIC BLOOD PRESSURE: 73 MMHG | RESPIRATION RATE: 20 BRPM | BODY MASS INDEX: 31.18 KG/M2 | SYSTOLIC BLOOD PRESSURE: 117 MMHG | WEIGHT: 194 LBS | HEIGHT: 66 IN | HEART RATE: 91 BPM | TEMPERATURE: 98 F

## 2024-05-09 DIAGNOSIS — R11.15 CYCLIC VOMITING SYNDROME: Primary | ICD-10-CM

## 2024-05-09 LAB
ALBUMIN SERPL-MCNC: 3.9 G/DL (ref 3.4–5)
ALBUMIN/GLOB SERPL: 0.9 {RATIO} (ref 1–2)
ALP LIVER SERPL-CCNC: 72 U/L
ALT SERPL-CCNC: 26 U/L
ANION GAP SERPL CALC-SCNC: 7 MMOL/L (ref 0–18)
AST SERPL-CCNC: 23 U/L (ref 15–37)
B-HCG UR QL: NEGATIVE
BASOPHILS # BLD AUTO: 0.03 X10(3) UL (ref 0–0.2)
BASOPHILS NFR BLD AUTO: 0.4 %
BILIRUB SERPL-MCNC: 0.5 MG/DL (ref 0.1–2)
BILIRUB UR QL STRIP.AUTO: NEGATIVE
BUN BLD-MCNC: 9 MG/DL (ref 9–23)
CALCIUM BLD-MCNC: 9.2 MG/DL (ref 8.5–10.1)
CHLORIDE SERPL-SCNC: 106 MMOL/L (ref 98–112)
CLARITY UR REFRACT.AUTO: CLEAR
CO2 SERPL-SCNC: 25 MMOL/L (ref 21–32)
CREAT BLD-MCNC: 1.02 MG/DL
EGFRCR SERPLBLD CKD-EPI 2021: 79 ML/MIN/1.73M2 (ref 60–?)
EOSINOPHIL # BLD AUTO: 0.01 X10(3) UL (ref 0–0.7)
EOSINOPHIL NFR BLD AUTO: 0.1 %
ERYTHROCYTE [DISTWIDTH] IN BLOOD BY AUTOMATED COUNT: 13 %
GLOBULIN PLAS-MCNC: 4.2 G/DL (ref 2.8–4.4)
GLUCOSE BLD-MCNC: 122 MG/DL (ref 70–99)
GLUCOSE UR STRIP.AUTO-MCNC: NEGATIVE MG/DL
HCT VFR BLD AUTO: 40.2 %
HGB BLD-MCNC: 13.8 G/DL
IMM GRANULOCYTES # BLD AUTO: 0.03 X10(3) UL (ref 0–1)
IMM GRANULOCYTES NFR BLD: 0.4 %
KETONES UR STRIP.AUTO-MCNC: 40 MG/DL
LEUKOCYTE ESTERASE UR QL STRIP.AUTO: NEGATIVE
LIPASE SERPL-CCNC: 17 U/L (ref 13–75)
LYMPHOCYTES # BLD AUTO: 1.61 X10(3) UL (ref 1–4)
LYMPHOCYTES NFR BLD AUTO: 22.7 %
MCH RBC QN AUTO: 30.3 PG (ref 26–34)
MCHC RBC AUTO-ENTMCNC: 34.3 G/DL (ref 31–37)
MCV RBC AUTO: 88.4 FL
MONOCYTES # BLD AUTO: 0.5 X10(3) UL (ref 0.1–1)
MONOCYTES NFR BLD AUTO: 7.1 %
NEUTROPHILS # BLD AUTO: 4.91 X10 (3) UL (ref 1.5–7.7)
NEUTROPHILS # BLD AUTO: 4.91 X10(3) UL (ref 1.5–7.7)
NEUTROPHILS NFR BLD AUTO: 69.3 %
NITRITE UR QL STRIP.AUTO: NEGATIVE
OSMOLALITY SERPL CALC.SUM OF ELEC: 286 MOSM/KG (ref 275–295)
PH UR STRIP.AUTO: 8.5 [PH] (ref 5–8)
PLATELET # BLD AUTO: 381 10(3)UL (ref 150–450)
POTASSIUM SERPL-SCNC: 3.2 MMOL/L (ref 3.5–5.1)
PROT SERPL-MCNC: 8.1 G/DL (ref 6.4–8.2)
PROT UR STRIP.AUTO-MCNC: NEGATIVE MG/DL
RBC # BLD AUTO: 4.55 X10(6)UL
SODIUM SERPL-SCNC: 138 MMOL/L (ref 136–145)
SP GR UR STRIP.AUTO: 1.02 (ref 1–1.03)
UROBILINOGEN UR STRIP.AUTO-MCNC: 0.2 MG/DL
WBC # BLD AUTO: 7.1 X10(3) UL (ref 4–11)

## 2024-05-09 PROCEDURE — 81001 URINALYSIS AUTO W/SCOPE: CPT | Performed by: EMERGENCY MEDICINE

## 2024-05-09 PROCEDURE — 81025 URINE PREGNANCY TEST: CPT

## 2024-05-09 PROCEDURE — 74177 CT ABD & PELVIS W/CONTRAST: CPT | Performed by: EMERGENCY MEDICINE

## 2024-05-09 PROCEDURE — 80053 COMPREHEN METABOLIC PANEL: CPT | Performed by: EMERGENCY MEDICINE

## 2024-05-09 PROCEDURE — 83690 ASSAY OF LIPASE: CPT | Performed by: EMERGENCY MEDICINE

## 2024-05-09 PROCEDURE — 96375 TX/PRO/DX INJ NEW DRUG ADDON: CPT

## 2024-05-09 PROCEDURE — 85025 COMPLETE CBC W/AUTO DIFF WBC: CPT | Performed by: EMERGENCY MEDICINE

## 2024-05-09 PROCEDURE — 81015 MICROSCOPIC EXAM OF URINE: CPT | Performed by: EMERGENCY MEDICINE

## 2024-05-09 PROCEDURE — 96374 THER/PROPH/DIAG INJ IV PUSH: CPT

## 2024-05-09 PROCEDURE — 99284 EMERGENCY DEPT VISIT MOD MDM: CPT

## 2024-05-09 PROCEDURE — 96361 HYDRATE IV INFUSION ADD-ON: CPT

## 2024-05-09 RX ORDER — POTASSIUM CHLORIDE 20 MEQ/1
40 TABLET, EXTENDED RELEASE ORAL ONCE
Status: COMPLETED | OUTPATIENT
Start: 2024-05-09 | End: 2024-05-09

## 2024-05-09 RX ORDER — KETOROLAC TROMETHAMINE 15 MG/ML
15 INJECTION, SOLUTION INTRAMUSCULAR; INTRAVENOUS ONCE
Status: COMPLETED | OUTPATIENT
Start: 2024-05-09 | End: 2024-05-09

## 2024-05-09 RX ORDER — ONDANSETRON 4 MG/1
4 TABLET, ORALLY DISINTEGRATING ORAL EVERY 6 HOURS PRN
Qty: 15 TABLET | Refills: 0 | Status: SHIPPED | OUTPATIENT
Start: 2024-05-09 | End: 2024-05-16

## 2024-05-09 RX ORDER — PROCHLORPERAZINE MALEATE 10 MG
10 TABLET ORAL EVERY 6 HOURS PRN
Qty: 20 TABLET | Refills: 0 | Status: SHIPPED | OUTPATIENT
Start: 2024-05-09

## 2024-05-09 RX ORDER — HALOPERIDOL 5 MG/ML
5 INJECTION INTRAMUSCULAR ONCE
Status: COMPLETED | OUTPATIENT
Start: 2024-05-09 | End: 2024-05-09

## 2024-05-09 RX ORDER — DIPHENHYDRAMINE HYDROCHLORIDE 50 MG/ML
25 INJECTION INTRAMUSCULAR; INTRAVENOUS ONCE
Status: COMPLETED | OUTPATIENT
Start: 2024-05-09 | End: 2024-05-09

## 2024-05-09 NOTE — ED PROVIDER NOTES
Patient Seen in: [facility] In [place]      History     Chief Complaint   Patient presents with    Nausea/Vomiting/Diarrhea     Stated Complaint: vomitting x2 days    Subjective:   HPI    24-year-old female presents for evaluation of vomiting and diarrhea.  Symptoms have been ongoing for the past 2 days.  Patient has a history of cyclic vomiting and currently is still smoking marijuana daily.  She was seen at [place] emergency department 2 days ago and prescribed Bentyl and Reglan without much improvement.  She has not been smoking marijuana in the last 2 days.  She does have some upper burning epigastric pain associated with her vomiting.    Objective:   Past Medical History:    Allergic rhinitis    Cyclical vomiting              Past Surgical History:   Procedure Laterality Date    Colonoscopy  2021    Endoscopy, bowel pouch, biopsy                  Social History     Socioeconomic History    Marital status: Single   Tobacco Use    Smoking status: Never    Smokeless tobacco: Never   Vaping Use    Vaping status: Never Used   Substance and Sexual Activity    Alcohol use: Yes     Alcohol/week: 6.0 standard drinks of alcohol     Types: 6 Standard drinks or equivalent per week     Comment: Occasional drinking.    Drug use: Not Currently     Types: Cannabis     Comment: last smoked tuesday am   Other Topics Concern    Caffeine Concern No    Exercise No    Seat Belt No    Special Diet No    Stress Concern No    Weight Concern No              Review of Systems    Positive for stated complaint: vomitting x2 days  Other systems are as noted in HPI.  Constitutional and vital signs reviewed.      All other systems reviewed and negative except as noted above.    Physical Exam     ED Triage Vitals [05/09/24 1739]   /71   Pulse 77   Resp 20   Temp 98.4 °F (36.9 °C)   Temp src Temporal   SpO2 100 %   O2 Device None (Room air)       Current Vitals:   Vital Signs  BP: 124/82  Pulse: 87  Resp:  18  Temp: 98.4 °F (36.9 °C)  Temp src: Temporal    Oxygen Therapy  SpO2: 100 %  O2 Device: None (Room air)            Physical Exam    General: Alert, oriented, no apparent distress  Neck: Supple  Lungs: Clear to auscultation bilaterally.  Heart: Regular rate and rhythm.  Abdomen: Soft, nontender.   Skin: No rash.  No edema.  Neurologic: No focal neurologic deficits.  Normal speech pattern  Musculoskeletal: No tenderness or deformity noted.  Full range of motion throughout.        ED Course     Labs Reviewed   COMP METABOLIC PANEL (14) - Abnormal; Notable for the following components:       Result Value    Glucose 122 (*)     Potassium 3.2 (*)     A/G Ratio 0.9 (*)     All other components within normal limits   URINALYSIS, ROUTINE - Abnormal; Notable for the following components:    Ketones Urine 40 (*)     Blood Urine Trace-lysed (*)     pH Urine 8.5 (*)     All other components within normal limits   LIPASE - Normal   UA MICROSCOPIC ONLY, URINE - Normal   POCT PREGNANCY URINE - Normal   CBC WITH DIFFERENTIAL WITH PLATELET    Narrative:     The following orders were created for panel order CBC With Differential With Platelet.  Procedure                               Abnormality         Status                     ---------                               -----------         ------                     CBC W/ DIFFERENTIAL[[id number]]                              Final result                 Please view results for these tests on the individual orders.   CBC W/ DIFFERENTIAL                      MDM      Differential diagnosis includes cannabis hyperemesis, peptic ulcer disease, pancreatitis, viral gastroenteritis      Normal LFTs.  Normal lipase.  Normal CBC    Chemistry with slightly low potassium of 3.2      UA negative       Medications   haloperidol lactate (Haldol) 5 MG/ML injection 5 mg (5 mg Intravenous Given 5/9/24 3893)   diphenhydrAMINE (Benadryl) 50 mg/mL  injection 25 mg (25 mg Intravenous Given 5/9/24 9177)    sodium chloride 0.9 % IV bolus 1,000 mL (0 mL Intravenous Stopped 5/9/24 1856)   potassium chloride (Klor-Con M20) tab 40 mEq (40 mEq Oral Given 5/9/24 1856)   ketorolac (Toradol) 15 MG/ML injection 15 mg (15 mg Intravenous Given 5/9/24 1912)   iopamidol 76% (ISOVUE-370) injection for power injector (100 mL Intravenous Given 5/9/24 2009)     CT ABDOMEN+PELVIS(CONTRAST ONLY)(CPT=74177)    Result Date: 5/9/2024  PROCEDURE:  CT ABDOMEN+PELVIS (CONTRAST ONLY) (CPT=74177)  COMPARISON:  None.  INDICATIONS:  vomitting x2 days  TECHNIQUE:  CT scanning was performed from the dome of the diaphragm to the pubic symphysis with non-ionic intravenous contrast material. Post contrast coronal MPR imaging was performed.  Dose reduction techniques were used. Dose information is transmitted to the ACR (American College of Radiology) NRDR (National Radiology Data Registry) which includes the Dose Index Registry.  PATIENT STATED HISTORY:(As transcribed by Technologist)  Patient has been vomitting for 2 days.    CONTRAST USED:  100cc of Isovue 370  FINDINGS: LUNG BASE:  Unremarkable. LIVER:  Focal fatty infiltration of the liver along the falciform ligament. BILIARY:  Unremarkable. SPLEEN:  Unremarkable. PANCREAS:  Unremarkable. ADRENALS:  Unremarkable. KIDNEYS:  Unremarkable. AORTA/VASCULAR:  Unremarkable. RETROPERITONEUM:  Unremarkable. BOWEL/MESENTERY:  Unremarkable appendix.  There is mild diffuse colonic wall thickening with fatty induration is concerning for nonspecific colitis.  Clinical correlation recommended.  Scattered feces in the colon.  No large or small bowel dilatation.  No free air.  Minimal free fluid in the pelvis. ABDOMINAL WALL:  Unremarkable. PELVIC ORGANS:  Unremarkable urinary bladder.  Unremarkable uterus and ovaries.  Nonspecific prominent right pelvic varices. LYMPH NODES:  No lymphadenopathy in the abdomen or pelvis. BONES:  Degenerative changes in the spine. OTHER:  None.            CONCLUSION:   1.  Findings concerning for nonspecific colitis.  Clinical correlation recommended.  2. Minimal nonspecific free fluid in the pelvis may be physiologic.  Please see above for further details.  LOCATION:  [facility]   Dictated by (CST): Vick Figueroa MD on 5/09/2024 at 8:19 PM     Finalized by (CST): Vick Figueroa MD on 5/09/2024 at 8:22 PM        Better after Haldol and able to tolerate water and her replacement potassium.  Patient will be discharged with prescription for Zofran which you can alternate with Reglan every 3-4 hours     Follow-up with PCP.  Return here if symptoms worsen or if new symptoms develop prior to follow-up       Medical Decision Making  Amount and/or Complexity of Data Reviewed  Independent Historian: parent     Details: Mother at bedside        Disposition and Plan     Clinical Impression:  1. Cyclic vomiting syndrome         Disposition:  Discharge  5/9/2024  8:29 pm    Follow-up:  Danya Lindsey,   [address] [facility] [address]  [facility] [address] [phone]    Call in 2 day(s)            Medications Prescribed:  Current Discharge Medication List        START taking these medications    Details   ondansetron 4 MG Oral Tablet Dispersible Take 1 tablet (4 mg total) by mouth every 6 (six) hours as needed for Nausea.  Qty: 15 tablet, Refills: 0

## 2024-05-09 NOTE — ED INITIAL ASSESSMENT (HPI)
Reports vomiting and diarrhea for past 2 days.  Reports constant \"burning\" abd pain.  Reports chills.

## 2024-05-22 ENCOUNTER — APPOINTMENT (OUTPATIENT)
Dept: GENERAL RADIOLOGY | Age: 24
End: 2024-05-22
Attending: EMERGENCY MEDICINE

## 2024-05-22 ENCOUNTER — HOSPITAL ENCOUNTER (EMERGENCY)
Age: 24
Discharge: HOME | End: 2024-05-22
Attending: EMERGENCY MEDICINE

## 2024-05-22 VITALS
DIASTOLIC BLOOD PRESSURE: 67 MMHG | BODY MASS INDEX: 31.18 KG/M2 | WEIGHT: 194 LBS | HEIGHT: 66 IN | HEART RATE: 93 BPM | RESPIRATION RATE: 17 BRPM | TEMPERATURE: 99 F | SYSTOLIC BLOOD PRESSURE: 116 MMHG | OXYGEN SATURATION: 98 %

## 2024-05-22 DIAGNOSIS — S39.012A STRAIN OF LUMBAR REGION, INITIAL ENCOUNTER: ICD-10-CM

## 2024-05-22 DIAGNOSIS — S16.1XXA STRAIN OF NECK MUSCLE, INITIAL ENCOUNTER: Primary | ICD-10-CM

## 2024-05-22 PROCEDURE — 72110 X-RAY EXAM L-2 SPINE 4/>VWS: CPT | Performed by: EMERGENCY MEDICINE

## 2024-05-22 PROCEDURE — 99284 EMERGENCY DEPT VISIT MOD MDM: CPT

## 2024-05-22 PROCEDURE — 72050 X-RAY EXAM NECK SPINE 4/5VWS: CPT | Performed by: EMERGENCY MEDICINE

## 2024-05-22 PROCEDURE — 99283 EMERGENCY DEPT VISIT LOW MDM: CPT

## 2024-05-22 RX ORDER — CYCLOBENZAPRINE HCL 10 MG
10 TABLET ORAL 3 TIMES DAILY PRN
Qty: 20 TABLET | Refills: 0 | Status: SHIPPED | OUTPATIENT
Start: 2024-05-22 | End: 2024-05-29
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
## 2024-05-23 NOTE — DISCHARGE INSTRUCTIONS
Cyclobenzaprine as needed for pain.  This is a muscle relaxer and can make you drowsy.  Do not work or drive when taking.

## 2024-05-28 ENCOUNTER — LAB ENCOUNTER (OUTPATIENT)
Dept: LAB | Age: 24
End: 2024-05-28
Attending: INTERNAL MEDICINE

## 2024-05-28 ENCOUNTER — OFFICE VISIT (OUTPATIENT)
Dept: INTERNAL MEDICINE CLINIC | Facility: CLINIC | Age: 24
End: 2024-05-28

## 2024-05-28 VITALS
BODY MASS INDEX: 30.73 KG/M2 | OXYGEN SATURATION: 99 % | WEIGHT: 191.19 LBS | DIASTOLIC BLOOD PRESSURE: 66 MMHG | RESPIRATION RATE: 16 BRPM | HEART RATE: 87 BPM | SYSTOLIC BLOOD PRESSURE: 107 MMHG | HEIGHT: 66 IN | TEMPERATURE: 98 F

## 2024-05-28 DIAGNOSIS — Z11.3 SCREENING EXAMINATION FOR STD (SEXUALLY TRANSMITTED DISEASE): ICD-10-CM

## 2024-05-28 DIAGNOSIS — Z11.3 SCREENING EXAMINATION FOR STD (SEXUALLY TRANSMITTED DISEASE): Primary | ICD-10-CM

## 2024-05-28 LAB
BILIRUB UR QL: NEGATIVE
CLARITY UR: CLEAR
GLUCOSE UR-MCNC: NORMAL MG/DL
HBV SURFACE AG SER-ACNC: <0.1 [IU]/L
HBV SURFACE AG SERPL QL IA: NONREACTIVE
HCV AB SERPL QL IA: NONREACTIVE
HGB UR QL STRIP.AUTO: NEGATIVE
KETONES UR-MCNC: NEGATIVE MG/DL
LEUKOCYTE ESTERASE UR QL STRIP.AUTO: NEGATIVE
NITRITE UR QL STRIP.AUTO: NEGATIVE
PH UR: 6.5 [PH] (ref 5–8)
PROT UR-MCNC: NEGATIVE MG/DL
SP GR UR STRIP: 1.02 (ref 1–1.03)
T PALLIDUM AB SER QL IA: NONREACTIVE
UROBILINOGEN UR STRIP-ACNC: NORMAL

## 2024-05-28 PROCEDURE — 81003 URINALYSIS AUTO W/O SCOPE: CPT

## 2024-05-28 PROCEDURE — 87086 URINE CULTURE/COLONY COUNT: CPT

## 2024-05-28 PROCEDURE — 86803 HEPATITIS C AB TEST: CPT

## 2024-05-28 PROCEDURE — 87491 CHLMYD TRACH DNA AMP PROBE: CPT

## 2024-05-28 PROCEDURE — 36415 COLL VENOUS BLD VENIPUNCTURE: CPT

## 2024-05-28 PROCEDURE — 87340 HEPATITIS B SURFACE AG IA: CPT

## 2024-05-28 PROCEDURE — 87591 N.GONORRHOEAE DNA AMP PROB: CPT

## 2024-05-28 PROCEDURE — 87389 HIV-1 AG W/HIV-1&-2 AB AG IA: CPT

## 2024-05-28 PROCEDURE — 86780 TREPONEMA PALLIDUM: CPT

## 2024-05-28 PROCEDURE — 99213 OFFICE O/P EST LOW 20 MIN: CPT | Performed by: INTERNAL MEDICINE
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
## 2024-05-29 LAB
C TRACH DNA SPEC QL NAA+PROBE: NEGATIVE
N GONORRHOEA DNA SPEC QL NAA+PROBE: NEGATIVE

## 2024-06-24 ENCOUNTER — HOSPITAL ENCOUNTER (OUTPATIENT)
Age: 24
Discharge: HOME | End: 2024-06-24

## 2024-06-24 VITALS
OXYGEN SATURATION: 100 % | DIASTOLIC BLOOD PRESSURE: 71 MMHG | WEIGHT: 194 LBS | RESPIRATION RATE: 18 BRPM | BODY MASS INDEX: 31.18 KG/M2 | SYSTOLIC BLOOD PRESSURE: 120 MMHG | HEART RATE: 75 BPM | TEMPERATURE: 98 F | HEIGHT: 66 IN

## 2024-06-24 DIAGNOSIS — S05.02XA ABRASION OF LEFT CORNEA, INITIAL ENCOUNTER: Primary | ICD-10-CM

## 2024-06-24 PROCEDURE — 99213 OFFICE O/P EST LOW 20 MIN: CPT

## 2024-06-24 RX ORDER — ETONOGESTREL AND ETHINYL ESTRADIOL VAGINAL RING .015; .12 MG/D; MG/D
1 RING VAGINAL
COMMUNITY

## 2024-06-24 RX ORDER — ERYTHROMYCIN 5 MG/G
1 OINTMENT OPHTHALMIC 4 TIMES DAILY
Qty: 1 EACH | Refills: 0 | Status: SHIPPED | OUTPATIENT
Start: 2024-06-24 | End: 2024-06-29

## 2024-06-24 RX ORDER — TETRACAINE HYDROCHLORIDE 5 MG/ML
1 SOLUTION OPHTHALMIC ONCE
Status: COMPLETED | OUTPATIENT
Start: 2024-06-24 | End: 2024-06-24

## 2024-06-24 NOTE — ED PROVIDER NOTES
Patient Seen in: [facility]      History     Chief Complaint   Patient presents with    Eye Visual Problem     Stated Complaint: red eye    Subjective:   HPI    25 YO female presents to immediate care for evaluation of left eye redness and irritation starting yesterday morning after removing false eyelashes.  Patient denies eye discharge, contact lens wear, vision changes.      Objective:   Past Medical History:    Allergic rhinitis    Cyclical vomiting              Past Surgical History:   Procedure Laterality Date    Colonoscopy  2021    Endoscopy, bowel pouch, biopsy                  Social History     Socioeconomic History    Marital status: Single   Tobacco Use    Smoking status: Never    Smokeless tobacco: Never   Vaping Use    Vaping status: Never Used   Substance and Sexual Activity    Alcohol use: Yes     Alcohol/week: 6.0 standard drinks of alcohol     Types: 6 Standard drinks or equivalent per week     Comment: Occasional drinking.    Drug use: Not Currently   Other Topics Concern    Caffeine Concern No    Exercise No    Seat Belt No    Special Diet No    Stress Concern No    Weight Concern No              Review of Systems    Positive for stated complaint: red eye  Other systems are as noted in HPI.  Constitutional and vital signs reviewed.      All other systems reviewed and negative except as noted above.    Physical Exam     ED Triage Vitals [06/24/24 1045]   /71   Pulse 75   Resp 18   Temp 98 °F (36.7 °C)   Temp src Oral   SpO2 100 %   O2 Device None (Room air)       Current Vitals:   Vital Signs  BP: 120/71  Pulse: 75  Resp: 18  Temp: 98 °F (36.7 °C)  Temp src: Oral    Oxygen Therapy  SpO2: 100 %  O2 Device: None (Room air)        Right Eye Chart Acuity: 20/20, Corrected  Left Eye Chart Acuity: 20/20, Corrected    Physical Exam  Vitals and nursing note reviewed.   Constitutional:       General: She is not in acute distress.     Appearance: Normal appearance. She is not  ill-appearing, toxic-appearing or diaphoretic.   Eyes:      General: Lids are normal.         Right eye: No discharge.         Left eye: No discharge.      Extraocular Movements: Extraocular movements intact.      Pupils: Pupils are equal, round, and reactive to light.        Comments: Very mild erythema at the left sclera. Small amount of fluorescein uptake at the left lateral eye   Cardiovascular:      Rate and Rhythm: Normal rate.   Pulmonary:      Effort: Pulmonary effort is normal. No respiratory distress.   Neurological:      Mental Status: She is alert and oriented to person, place, and time.   Psychiatric:         Mood and Affect: Mood normal.         Behavior: Behavior normal.         ED Course   Labs Reviewed - No data to display    MDM      Differential diagnosis considered but not limited to corneal abrasion, foreign body, conjunctivitis, less likely preseptal cellulitis    Visual acuity wnl.   Physical exam is consistent with small corneal abrasion. Erythromycin ophthalmic ointment prescribed to prevent bacterial superinfection.      Medical Decision Making  Risk  Prescription drug management.        Disposition and Plan     Clinical Impression:  1. Abrasion of left cornea, initial encounter         Disposition:  Discharge  6/24/2024 11:41 am    Follow-up:  Danya Lindsey DO  [address] [facility] [address]  [facility] [address]  [phone]                Medications Prescribed:  Discharge Medication List as of 6/24/2024 11:44 AM        START taking these medications    Details   erythromycin 5 MG/GM Ophthalmic Ointment Place 1 Application into the left eye in the morning, at noon, in the evening, and at bedtime for 5 days., Normal, Disp-1 each, R-0

## 2024-07-24 ENCOUNTER — LAB ENCOUNTER (OUTPATIENT)
Dept: LAB | Age: 24
End: 2024-07-24
Attending: INTERNAL MEDICINE
Payer: MEDICAID

## 2024-07-24 ENCOUNTER — OFFICE VISIT (OUTPATIENT)
Dept: INTERNAL MEDICINE CLINIC | Facility: CLINIC | Age: 24
End: 2024-07-24
Payer: MEDICAID

## 2024-07-24 ENCOUNTER — PATIENT MESSAGE (OUTPATIENT)
Dept: INTERNAL MEDICINE CLINIC | Facility: CLINIC | Age: 24
End: 2024-07-24

## 2024-07-24 VITALS
TEMPERATURE: 98 F | HEIGHT: 66 IN | SYSTOLIC BLOOD PRESSURE: 116 MMHG | OXYGEN SATURATION: 98 % | DIASTOLIC BLOOD PRESSURE: 68 MMHG | BODY MASS INDEX: 30.02 KG/M2 | HEART RATE: 72 BPM | RESPIRATION RATE: 14 BRPM | WEIGHT: 186.81 LBS

## 2024-07-24 DIAGNOSIS — R11.15 CYCLICAL VOMITING: Primary | ICD-10-CM

## 2024-07-24 DIAGNOSIS — N92.6 MISSED PERIOD: ICD-10-CM

## 2024-07-24 DIAGNOSIS — Z11.3 SCREENING EXAMINATION FOR STD (SEXUALLY TRANSMITTED DISEASE): ICD-10-CM

## 2024-07-24 LAB
B-HCG SERPL-ACNC: <2.6 MIU/ML
HBV SURFACE AG SER-ACNC: <0.1 [IU]/L
HBV SURFACE AG SERPL QL IA: NONREACTIVE
HCV AB SERPL QL IA: NONREACTIVE
T PALLIDUM AB SER QL IA: NONREACTIVE

## 2024-07-24 PROCEDURE — 87591 N.GONORRHOEAE DNA AMP PROB: CPT

## 2024-07-24 PROCEDURE — 87389 HIV-1 AG W/HIV-1&-2 AB AG IA: CPT

## 2024-07-24 PROCEDURE — 36415 COLL VENOUS BLD VENIPUNCTURE: CPT

## 2024-07-24 PROCEDURE — 86780 TREPONEMA PALLIDUM: CPT

## 2024-07-24 PROCEDURE — 86803 HEPATITIS C AB TEST: CPT

## 2024-07-24 PROCEDURE — 87491 CHLMYD TRACH DNA AMP PROBE: CPT

## 2024-07-24 PROCEDURE — 87340 HEPATITIS B SURFACE AG IA: CPT

## 2024-07-24 PROCEDURE — 99213 OFFICE O/P EST LOW 20 MIN: CPT | Performed by: INTERNAL MEDICINE

## 2024-07-24 PROCEDURE — 84702 CHORIONIC GONADOTROPIN TEST: CPT

## 2024-07-24 NOTE — PROGRESS NOTES
Patient Office Visit    ASSESSMENT AND PLAN:   1. Cyclical vomiting  Note: Recommended to hold off on marijuana and alcohol.  Patient would like an opinion from GI.  Referral sent  - Gastro Referral - External    2. Screening examination for STD (sexually transmitted disease)  Note: Testing ordered  - HIV AG AB Combo [E]; Future  - T Pallidum Screening Cascade [E]; Future  - Chlamydia/Gc Amplification [E]; Future  - Hepatitis B Surface Antigen [E]; Future  - HCV Antibody [E]; Future    3. Missed period  - HCG, Beta Subunit (Quant Pregnancy Test) [E]; Future    Return to clinic in a month for physical      Patient/Caregiver Education: Patient/Caregiver Education: There are no barriers to learning. Medical education done. Outcome: Patient verbalizes understanding. Patient is notified to call with any questions, complications, allergies, or worsening or changing symptoms.  Patient is to call with any side effects or complications from the treatments as a result of today.      Reviewed Past Medical History and   Patient Active Problem List   Diagnosis   (none) - all problems resolved or deleted       Orders Placed This Encounter   Procedures    HCG, Beta Subunit (Quant Pregnancy Test) [E]     Standing Status:   Future     Standing Expiration Date:   7/24/2025     Order Specific Question:   Release to patient     Answer:   Immediate    HIV AG AB Combo [E]     Standing Status:   Future     Standing Expiration Date:   7/24/2025     Order Specific Question:   Consent obtained?     Answer:   Yes     Order Specific Question:   Release to patient     Answer:   Immediate    T Pallidum Screening [place] [E]     Standing Status:   Future     Standing Expiration Date:   7/24/2025     Order Specific Question:   Release to patient     Answer:   Immediate    Hepatitis B Surface Antigen [E]     Standing Status:   Future     Standing Expiration Date:   7/24/2025     Order Specific Question:   Release to patient     Answer:   Immediate     HCV Antibody [E]     Standing Status:   Future     Standing Expiration Date:   7/24/2025     Order Specific Question:   Release to patient     Answer:   Immediate     Requested Prescriptions      No prescriptions requested or ordered in this encounter         Danya Lindsey DO  CC:  Chief Complaint   Patient presents with    Vomiting         HPI:   Sheree Olivares is a 24 year old female who presents for vomiting    Cyclical vomiting: has been going on since she was a child. She recently started birth control, but does not have any vomiting prior to her menses.  She denies any migraines and denies significant abdominal bloating.  She has stopped marijuana since her last ER visit and is wondering if it is due to the alcohol.  She was using marijuana for a long time    Past Medical History:    Allergic rhinitis    Cyclical vomiting       Past Surgical History:   Procedure Laterality Date    Colonoscopy  2021    Endoscopy, bowel pouch, biopsy         Social History:  Social History     Socioeconomic History    Marital status: Single   Tobacco Use    Smoking status: Never    Smokeless tobacco: Never   Vaping Use    Vaping status: Never Used   Substance and Sexual Activity    Alcohol use: Yes     Alcohol/week: 6.0 standard drinks of alcohol     Types: 6 Standard drinks or equivalent per week     Comment: Occasional drinking.    Drug use: Not Currently     Types: Cannabis     Comment: The last time I smoked weed was May 7th, 2024.   Other Topics Concern    Caffeine Concern No    Exercise No    Seat Belt No    Special Diet No    Stress Concern No    Weight Concern No     Family History:  Family History   Problem Relation Age of Onset    Diabetes Father     Other (Other) Mother         multiple sclerosis    Cancer Maternal Grandmother         breast cancer    No Known Problems Maternal Grandfather     No Known Problems Brother     Heart Disorder Neg     Stroke Neg      Allergies:  No Known Allergies  Current  Meds:  Current Outpatient Medications on File Prior to Visit   Medication Sig Dispense Refill    Etonogestrel-Ethinyl Estradiol (NUVARING) 0.12-0.015 MG/24HR Vaginal Ring Place 1 Ring vaginally.      Ferrous Sulfate 325 (65 Fe) MG Oral Tab Take 1 tablet (325 mg total) by mouth daily with breakfast. (Patient not taking: Reported on 7/24/2024)  0     No current facility-administered medications on file prior to visit.         REVIEW OF SYSTEMS   Constitutional: no fatigue normal energy no weight changes   HENT: normal sinuses and no mouth issues   Eyes: . normal vision no eye pain   Respiratory: normal respirations no cough   Cardiovascular: no CP, or palpitations   Gastrointestinal: normal bowels and no abd pains   Genitourinary:  normal urination no hematuria, no frequency   Musculoskeletal: no pains in arms/legs, normal range of motion   Skin: no rashes or skin lesions that are new   Neurological:  no weakness, no numbness, normal gait   Hematological:  no bruises or bleeding   Psychiatric/Behavioral: normal mood no anxiety normal behavior     /68 (BP Location: Right arm, Patient Position: Sitting, Cuff Size: adult)   Pulse 72   Temp 98.3 °F (36.8 °C) (Temporal)   Resp 14   Ht 5' 6\" (1.676 m)   Wt 186 lb 12.8 oz (84.7 kg)   LMP 07/09/2024 (Exact Date)   SpO2 98%   BMI 30.15 kg/m²     PHYSICAL EXAM:   Constitutional: Vital signs reviewed as noted, well developed, in no acute distress.   HENT: NCAT  Eyes: pupils reactive bilaterally  Cardiovascular: nl s1 s2 no m/r/g  Pulmonary/Chest: CTA bilaterally with no wheezes  Extremities: no pedal edema   Neurological:  no weakness in UE and LE, reflexes are normal  Skin: no rashes or bruises on visualized skin, not undressed   Psychiatric:normal mood

## 2024-07-25 LAB
C TRACH DNA SPEC QL NAA+PROBE: NEGATIVE
N GONORRHOEA DNA SPEC QL NAA+PROBE: NEGATIVE

## 2024-07-25 NOTE — TELEPHONE ENCOUNTER
From: Sheree Olivares  To: Danya Lindsey  Sent: 7/24/2024 11:39 AM CDT  Subject: Picture Of My Insurance Card     I am attaching a picture of my insurance card for you all.

## 2024-09-28 ENCOUNTER — OFFICE VISIT (OUTPATIENT)
Dept: INTERNAL MEDICINE CLINIC | Facility: CLINIC | Age: 24
End: 2024-09-28
Payer: MEDICAID

## 2024-09-28 VITALS
SYSTOLIC BLOOD PRESSURE: 118 MMHG | OXYGEN SATURATION: 98 % | WEIGHT: 192.38 LBS | HEIGHT: 66 IN | HEART RATE: 76 BPM | RESPIRATION RATE: 14 BRPM | BODY MASS INDEX: 30.92 KG/M2 | DIASTOLIC BLOOD PRESSURE: 66 MMHG | TEMPERATURE: 99 F

## 2024-09-28 DIAGNOSIS — Z12.4 SCREENING FOR CERVICAL CANCER: ICD-10-CM

## 2024-09-28 DIAGNOSIS — Z00.00 ROUTINE PHYSICAL EXAMINATION: Primary | ICD-10-CM

## 2024-09-28 PROCEDURE — 88175 CYTOPATH C/V AUTO FLUID REDO: CPT | Performed by: INTERNAL MEDICINE

## 2024-09-28 PROCEDURE — 87624 HPV HI-RISK TYP POOLED RSLT: CPT | Performed by: INTERNAL MEDICINE

## 2024-09-28 PROCEDURE — 99395 PREV VISIT EST AGE 18-39: CPT | Performed by: INTERNAL MEDICINE

## 2024-09-28 NOTE — PATIENT INSTRUCTIONS
Continue to exercise at least 150 minutes a week and Eat a plant based diet     Please take 2000 IU of vitamin D daily for life to keep your bones strong    Please see your dentist every 6 months  Continue with regular eye exams    We did your Pap today and it may take a few weeks for the results to come back    Please get your flu vaccine in October

## 2024-09-28 NOTE — PROGRESS NOTES
Patient Office Visit    ASSESSMENT AND PLAN:   1. Routine physical examination  Note: Continue to exercise at least 150 minutes a week and Eat a plant based diet. Please take 2000 IU of vitamin D daily for life to keep your bones strong. Please see your dentist every 6 months.  Continue with regular eye exams.  Please get the flu vaccine sometime in October 2. Screening for cervical cancer  - ThinPrep PAP with HPV Reflex Request B; Future    Return to clinic yearly for routine physical      Patient/Caregiver Education: Patient/Caregiver Education: There are no barriers to learning. Medical education done. Outcome: Patient verbalizes understanding. Patient is notified to call with any questions, complications, allergies, or worsening or changing symptoms.  Patient is to call with any side effects or complications from the treatments as a result of today.      Reviewed Past Medical History and   Patient Active Problem List   Diagnosis   (none) - all problems resolved or deleted       No orders of the defined types were placed in this encounter.    Requested Prescriptions      No prescriptions requested or ordered in this encounter         Danya Lindsey DO  CC:  Chief Complaint   Patient presents with    Physical         HPI:   Sheree Olivares is a 24-year-old female who presents for a routine physical.  Overall doing well with no questions or concerns    Past Medical History:    Allergic rhinitis    Cyclical vomiting       Past Surgical History:   Procedure Laterality Date    Colonoscopy  2021    Endoscopy, bowel pouch, biopsy         Social History:  Social History     Socioeconomic History    Marital status: Single   Tobacco Use    Smoking status: Never    Smokeless tobacco: Never   Vaping Use    Vaping status: Never Used   Substance and Sexual Activity    Alcohol use: Yes     Alcohol/week: 6.0 standard drinks of alcohol     Types: 6 Standard drinks or equivalent per week     Comment: Occasional  drinking.    Drug use: Not Currently     Types: Cannabis     Comment: The last time I smoked weed was May 7th, 2024.   Other Topics Concern    Caffeine Concern No    Exercise No    Seat Belt No    Special Diet No    Stress Concern No    Weight Concern No     Family History:  Family History   Problem Relation Age of Onset    Diabetes Father     Other (Other) Mother         multiple sclerosis    Cancer Maternal Grandmother         breast cancer    No Known Problems Maternal Grandfather     No Known Problems Brother     Heart Disorder Neg     Stroke Neg      Allergies:  No Known Allergies  Current Meds:  Current Outpatient Medications on File Prior to Visit   Medication Sig Dispense Refill    Etonogestrel-Ethinyl Estradiol (NUVARING) 0.12-0.015 MG/24HR Vaginal Ring Place 1 Ring vaginally.      Ferrous Sulfate 325 (65 Fe) MG Oral Tab Take 1 tablet (325 mg total) by mouth daily with breakfast. (Patient not taking: Reported on 7/24/2024)  0     No current facility-administered medications on file prior to visit.         REVIEW OF SYSTEMS   Constitutional: no fatigue normal energy no weight changes   HENT: normal sinuses and no mouth issues   Eyes: . normal vision no eye pain   Respiratory: normal respirations no cough   Cardiovascular: no CP, or palpitations   Gastrointestinal: normal bowels and no abd pains   Genitourinary:  normal urination no hematuria, no frequency   Musculoskeletal: no pains in arms/legs, normal range of motion   Skin: no rashes or skin lesions that are new   Neurological:  no weakness, no numbness, normal gait   Hematological:  no bruises or bleeding   Psychiatric/Behavioral: normal mood no anxiety normal behavior     /66 (BP Location: Right arm, Patient Position: Sitting, Cuff Size: adult)   Pulse 76   Temp 98.6 °F (37 °C) (Temporal)   Resp 14   Ht 5' 6\" (1.676 m)   Wt 192 lb 6.4 oz (87.3 kg)   LMP 09/13/2024 (Exact Date)   SpO2 98%   BMI 31.05 kg/m²     PHYSICAL EXAM:    Constitutional: Vital signs reviewed as noted, well developed, in no acute distress.   HENT: NCAT, bilateral ear canal and tympanic membrane appear normal  Eyes: pupils reactive bilaterally  Neck: No thyroidmegaly  Cardiovascular: nl s1 s2 no m/r/g  Pulmonary/Chest: CTA bilaterally with no wheezes  Abdominal: Soft NT normal Bowel sounds  : normal external genitalia without skin lesions or rashes, normal cervix, no lesions, no abnormal discharge,  Extremities: no pedal edema   Neurological:  no weakness in UE and LE, reflexes are normal  Skin: no rashes or bruises on visualized skin, not undressed   Psychiatric:normal mood     MA: Lakia Sorensen present in the room

## 2024-10-03 LAB
.: NORMAL
.: NORMAL
HPV E6+E7 MRNA CVX QL NAA+PROBE: NEGATIVE

## 2024-10-08 ENCOUNTER — OFFICE VISIT (OUTPATIENT)
Dept: INTERNAL MEDICINE CLINIC | Facility: CLINIC | Age: 24
End: 2024-10-08
Payer: MEDICAID

## 2024-10-08 ENCOUNTER — TELEPHONE (OUTPATIENT)
Dept: INTERNAL MEDICINE CLINIC | Facility: CLINIC | Age: 24
End: 2024-10-08

## 2024-10-08 ENCOUNTER — LAB ENCOUNTER (OUTPATIENT)
Dept: LAB | Age: 24
End: 2024-10-08
Attending: INTERNAL MEDICINE
Payer: MEDICAID

## 2024-10-08 VITALS
HEART RATE: 72 BPM | HEIGHT: 66 IN | TEMPERATURE: 98 F | SYSTOLIC BLOOD PRESSURE: 120 MMHG | DIASTOLIC BLOOD PRESSURE: 70 MMHG | BODY MASS INDEX: 30.73 KG/M2 | RESPIRATION RATE: 16 BRPM | OXYGEN SATURATION: 98 % | WEIGHT: 191.19 LBS

## 2024-10-08 DIAGNOSIS — Z11.3 SCREENING EXAMINATION FOR STD (SEXUALLY TRANSMITTED DISEASE): ICD-10-CM

## 2024-10-08 DIAGNOSIS — Z30.09 BIRTH CONTROL COUNSELING: ICD-10-CM

## 2024-10-08 DIAGNOSIS — N30.00 ACUTE CYSTITIS WITHOUT HEMATURIA: Primary | ICD-10-CM

## 2024-10-08 LAB
APPEARANCE: CLEAR
BILIRUBIN: NEGATIVE
GLUCOSE (URINE DIPSTICK): NEGATIVE MG/DL
HBV SURFACE AG SER-ACNC: <0.1 [IU]/L
HBV SURFACE AG SERPL QL IA: NONREACTIVE
HCV AB SERPL QL IA: NONREACTIVE
KETONES (URINE DIPSTICK): NEGATIVE MG/DL
LEUKOCYTES: NEGATIVE
MULTISTIX EXPIRATION DATE: 2025 DATE
MULTISTIX LOT#: 4518 NUMERIC
NITRITE, URINE: NEGATIVE
OCCULT BLOOD: NEGATIVE
PH, URINE: 6 (ref 4.5–8)
PROTEIN (URINE DIPSTICK): NEGATIVE MG/DL
SPECIFIC GRAVITY: 1.03 (ref 1–1.03)
T PALLIDUM AB SER QL IA: NONREACTIVE
URINE-COLOR: YELLOW
UROBILINOGEN,SEMI-QN: 0.2 MG/DL (ref 0–1.9)

## 2024-10-08 PROCEDURE — 86780 TREPONEMA PALLIDUM: CPT

## 2024-10-08 PROCEDURE — 87389 HIV-1 AG W/HIV-1&-2 AB AG IA: CPT

## 2024-10-08 PROCEDURE — 87086 URINE CULTURE/COLONY COUNT: CPT | Performed by: INTERNAL MEDICINE

## 2024-10-08 PROCEDURE — 87340 HEPATITIS B SURFACE AG IA: CPT

## 2024-10-08 PROCEDURE — 99213 OFFICE O/P EST LOW 20 MIN: CPT | Performed by: INTERNAL MEDICINE

## 2024-10-08 PROCEDURE — 86803 HEPATITIS C AB TEST: CPT

## 2024-10-08 PROCEDURE — 81003 URINALYSIS AUTO W/O SCOPE: CPT | Performed by: INTERNAL MEDICINE

## 2024-10-08 PROCEDURE — 36415 COLL VENOUS BLD VENIPUNCTURE: CPT

## 2024-10-08 RX ORDER — NITROFURANTOIN 25; 75 MG/1; MG/1
100 CAPSULE ORAL 2 TIMES DAILY
Qty: 10 CAPSULE | Refills: 0 | Status: SHIPPED | OUTPATIENT
Start: 2024-10-08 | End: 2024-10-13

## 2024-10-08 RX ORDER — ETONOGESTREL AND ETHINYL ESTRADIOL VAGINAL RING .015; .12 MG/D; MG/D
1 RING VAGINAL
Qty: 3 RING | Refills: 1 | Status: SHIPPED | OUTPATIENT
Start: 2024-10-08

## 2024-10-08 NOTE — PATIENT INSTRUCTIONS
Lets start the antibiotic.  I will let you know what the final urine culture shows.  I have ordered STD screening as well

## 2024-12-11 ENCOUNTER — OFFICE VISIT (OUTPATIENT)
Facility: CLINIC | Age: 24
End: 2024-12-11
Payer: MEDICAID

## 2024-12-11 VITALS
HEIGHT: 66 IN | WEIGHT: 198 LBS | BODY MASS INDEX: 31.82 KG/M2 | DIASTOLIC BLOOD PRESSURE: 78 MMHG | SYSTOLIC BLOOD PRESSURE: 124 MMHG

## 2024-12-11 DIAGNOSIS — R11.11 VOMITING WITHOUT NAUSEA, UNSPECIFIED VOMITING TYPE: Primary | ICD-10-CM

## 2024-12-11 PROCEDURE — 99244 OFF/OP CNSLTJ NEW/EST MOD 40: CPT | Performed by: STUDENT IN AN ORGANIZED HEALTH CARE EDUCATION/TRAINING PROGRAM

## 2024-12-11 NOTE — H&P
[facility] - Gastroenterology                                                                                                               Reason for consult: hx of nausea/vomiting    Requesting physician or provider: Danya Lindsey DO    Chief Complaint   Patient presents with    Consult     vomiting       HPI:   Sheree Olivares is a 24 year old year-old woman with history of cannabis hyperemesis syndrome who presents to GI clinic to discuss prior symptoms of vomiting.    The patient previously smoked marijuana several times  throughout the day every day.  When she was using marijuana she noticed recurrent cyclical episodes of nausea/vomiting that improved with a hot shower.    Once she stopped using marijuana her symptoms completely resolved.    One episode of vomiting after consuming tequila in July but has been okay since then.    Prior workup included EGD/Colonoscopy (2021) which were unremarkable.    Prior endoscopies:  As above    Soc:  -no smoking  -rare Etoh    Wt Readings from Last 6 Encounters:   12/11/24 198 lb (89.8 kg)   10/08/24 191 lb 3.2 oz (86.7 kg)   09/28/24 192 lb 6.4 oz (87.3 kg)   07/24/24 186 lb 12.8 oz (84.7 kg)   06/24/24 194 lb (88 kg)   05/28/24 191 lb 3.2 oz (86.7 kg)        History, Medications, Allergies, ROS:      Past Medical History:    Allergic rhinitis    Cyclical vomiting      Past Surgical History:   Procedure Laterality Date    Colonoscopy  2021    Endoscopy, bowel pouch, biopsy        Family Hx:   Family History   Problem Relation Age of Onset    Diabetes Father     Other (Other) Mother         multiple sclerosis    Cancer Maternal Grandmother         breast cancer    No Known Problems Maternal Grandfather     No Known Problems Brother     Heart Disorder Neg     Stroke Neg       Social History:   Social History     Socioeconomic History    Marital status: Single   Tobacco  Use    Smoking status: Never    Smokeless tobacco: Never   Vaping Use    Vaping status: Never Used   Substance and Sexual Activity    Alcohol use: Yes     Alcohol/week: 6.0 standard drinks of alcohol     Types: 6 Standard drinks or equivalent per week     Comment: Occasional drinking.    Drug use: Not Currently     Types: Cannabis     Comment: The last time I smoked weed was May 7th, 2024.   Other Topics Concern    Caffeine Concern No    Exercise No    Seat Belt No    Special Diet No    Stress Concern No    Weight Concern No        Medications (Active prior to today's visit):  Current Outpatient Medications   Medication Sig Dispense Refill    Etonogestrel-Ethinyl Estradiol (NUVARING) 0.12-0.015 MG/24HR Vaginal Ring Place 1 Ring vaginally every 21 days. 3 Ring 1       Allergies:  Allergies[1]    ROS:   CONSTITUTIONAL:  negative for fevers, rigors  EYES:  negative for diplopia   RESPIRATORY:  negative for severe shortness of breath  CARDIOVASCULAR:  negative for crushing sub-sternal chest pain  GASTROINTESTINAL:  see HPI  GENITOURINARY:  negative for dysuria or gross hematuria  INTEGUMENT/BREAST:  SKIN:  negative for jaundice   ALLERGIC/IMMUNOLOGIC:  negative for hay fever  ENDOCRINE:  negative for cold intolerance and heat intolerance  MUSCULOSKELETAL:  negative for joint effusion/severe erythema  BEHAVIOR/PSYCH:  negative for psychotic behavior      PHYSICAL EXAM:   Blood pressure 124/78, height 5' 6\" (1.676 m), weight 198 lb (89.8 kg), last menstrual period 09/13/2024, not currently breastfeeding.    Gen- Patient appears comfortable and in no acute discomfort  HEENT: the sclera appears anicteric, oropharynx clear, mucus membranes appear moist  CV- regular rate and rhythm, the extremities are warm and well perfused   Lung- Moves air well; No labored breathing  Abdomen- soft, non-tender exam in all quadrants without rigidity or guarding, non-distended  Skin- No jaundice  Ext: no edema is evident.   Neuro- Alert and  interactive, and gross movements of extremities normal  Psych - appropriate, non-agitated    Labs/Imaging:     Patient's pertinent labs and imaging were reviewed and discussed with patient today.      ASSESSMENT/PLAN:   Sheree Olivares is a 24 year old year-old woman with history of cannabis hyperemesis syndrome who presents to GI clinic to discuss prior symptoms of vomiting.    #Cannabis hyperemesis syndrome  -symptoms resolved after discontinued smoking of marijuana.  -Prior Egd/Colonoscopy 2021 unremarkable.  -Feels great today and has not had any issues for months.   -No longer using marijuana, this is the best treatment of her condition.    Will follow up should symptoms recur.    Orders This Visit:  No orders of the defined types were placed in this encounter.      Meds This Visit:  Requested Prescriptions      No prescriptions requested or ordered in this encounter       Imaging & Referrals:  None         Asihsh Austin MD  [facility]  12/11/2024      This note was partially prepared using Dragon Medical voice recognition dictation software. As a result, errors may occur. When identified, these errors have been corrected. While every attempt is made to correct errors during dictation, discrepancies may still exist.          [1] No Known Allergies

## 2025-01-05 ENCOUNTER — HOSPITAL ENCOUNTER (EMERGENCY)
Age: 25
Discharge: HOME | End: 2025-01-05
Attending: EMERGENCY MEDICINE
Payer: MEDICAID

## 2025-01-05 VITALS
TEMPERATURE: 97 F | DIASTOLIC BLOOD PRESSURE: 70 MMHG | BODY MASS INDEX: 31.08 KG/M2 | HEIGHT: 67 IN | SYSTOLIC BLOOD PRESSURE: 114 MMHG | OXYGEN SATURATION: 97 % | WEIGHT: 198 LBS | RESPIRATION RATE: 14 BRPM | HEART RATE: 64 BPM

## 2025-01-05 DIAGNOSIS — K04.7 TOOTH INFECTION: ICD-10-CM

## 2025-01-05 DIAGNOSIS — R51.9 ACUTE NONINTRACTABLE HEADACHE, UNSPECIFIED HEADACHE TYPE: Primary | ICD-10-CM

## 2025-01-05 DIAGNOSIS — R42 DIZZINESS: ICD-10-CM

## 2025-01-05 LAB
ALBUMIN SERPL-MCNC: 4.3 G/DL (ref 3.2–4.8)
ALBUMIN/GLOB SERPL: 1.3 {RATIO} (ref 1–2)
ALP LIVER SERPL-CCNC: 75 U/L
ALT SERPL-CCNC: 7 U/L
ANION GAP SERPL CALC-SCNC: 4 MMOL/L (ref 0–18)
AST SERPL-CCNC: 14 U/L (ref ?–34)
ATRIAL RATE: 61 BPM
B-HCG UR QL: NEGATIVE
BASOPHILS # BLD AUTO: 0.03 X10(3) UL (ref 0–0.2)
BASOPHILS NFR BLD AUTO: 0.5 %
BILIRUB SERPL-MCNC: 0.2 MG/DL (ref 0.3–1.2)
BUN BLD-MCNC: 7 MG/DL (ref 9–23)
CALCIUM BLD-MCNC: 9.6 MG/DL (ref 8.7–10.4)
CHLORIDE SERPL-SCNC: 108 MMOL/L (ref 98–112)
CO2 SERPL-SCNC: 26 MMOL/L (ref 21–32)
CREAT BLD-MCNC: 0.88 MG/DL
EGFRCR SERPLBLD CKD-EPI 2021: 94 ML/MIN/1.73M2 (ref 60–?)
EOSINOPHIL # BLD AUTO: 0.1 X10(3) UL (ref 0–0.7)
EOSINOPHIL NFR BLD AUTO: 1.7 %
ERYTHROCYTE [DISTWIDTH] IN BLOOD BY AUTOMATED COUNT: 12.9 %
GLOBULIN PLAS-MCNC: 3.4 G/DL (ref 2–3.5)
GLUCOSE BLD-MCNC: 104 MG/DL (ref 70–99)
HCT VFR BLD AUTO: 38.6 %
HGB BLD-MCNC: 12.9 G/DL
IMM GRANULOCYTES # BLD AUTO: 0.03 X10(3) UL (ref 0–1)
IMM GRANULOCYTES NFR BLD: 0.5 %
LYMPHOCYTES # BLD AUTO: 3.06 X10(3) UL (ref 1–4)
LYMPHOCYTES NFR BLD AUTO: 52.2 %
MCH RBC QN AUTO: 29.3 PG (ref 26–34)
MCHC RBC AUTO-ENTMCNC: 33.4 G/DL (ref 31–37)
MCV RBC AUTO: 87.5 FL
MONOCYTES # BLD AUTO: 0.47 X10(3) UL (ref 0.1–1)
MONOCYTES NFR BLD AUTO: 8 %
NEUTROPHILS # BLD AUTO: 2.17 X10 (3) UL (ref 1.5–7.7)
NEUTROPHILS # BLD AUTO: 2.17 X10(3) UL (ref 1.5–7.7)
NEUTROPHILS NFR BLD AUTO: 37.1 %
OSMOLALITY SERPL CALC.SUM OF ELEC: 284 MOSM/KG (ref 275–295)
P AXIS: 77 DEGREES
P-R INTERVAL: 150 MS
PLATELET # BLD AUTO: 346 10(3)UL (ref 150–450)
POTASSIUM SERPL-SCNC: 3.8 MMOL/L (ref 3.5–5.1)
PROT SERPL-MCNC: 7.7 G/DL (ref 5.7–8.2)
Q-T INTERVAL: 422 MS
QRS DURATION: 80 MS
QTC CALCULATION (BEZET): 424 MS
R AXIS: 82 DEGREES
RBC # BLD AUTO: 4.41 X10(6)UL
SODIUM SERPL-SCNC: 138 MMOL/L (ref 136–145)
T AXIS: 42 DEGREES
VENTRICULAR RATE: 61 BPM
WBC # BLD AUTO: 5.9 X10(3) UL (ref 4–11)

## 2025-01-05 PROCEDURE — 96374 THER/PROPH/DIAG INJ IV PUSH: CPT

## 2025-01-05 PROCEDURE — 81025 URINE PREGNANCY TEST: CPT

## 2025-01-05 PROCEDURE — 93010 ELECTROCARDIOGRAM REPORT: CPT

## 2025-01-05 PROCEDURE — 93005 ELECTROCARDIOGRAM TRACING: CPT

## 2025-01-05 PROCEDURE — 96361 HYDRATE IV INFUSION ADD-ON: CPT

## 2025-01-05 PROCEDURE — 80053 COMPREHEN METABOLIC PANEL: CPT | Performed by: PHYSICIAN ASSISTANT

## 2025-01-05 PROCEDURE — 99284 EMERGENCY DEPT VISIT MOD MDM: CPT

## 2025-01-05 PROCEDURE — 96375 TX/PRO/DX INJ NEW DRUG ADDON: CPT

## 2025-01-05 PROCEDURE — 85025 COMPLETE CBC W/AUTO DIFF WBC: CPT | Performed by: PHYSICIAN ASSISTANT

## 2025-01-05 RX ORDER — MECLIZINE HYDROCHLORIDE 25 MG/1
25 TABLET ORAL 3 TIMES DAILY PRN
Qty: 12 TABLET | Refills: 0 | Status: SHIPPED | OUTPATIENT
Start: 2025-01-05

## 2025-01-05 RX ORDER — DIPHENHYDRAMINE HYDROCHLORIDE 50 MG/ML
25 INJECTION INTRAMUSCULAR; INTRAVENOUS ONCE
Status: COMPLETED | OUTPATIENT
Start: 2025-01-05 | End: 2025-01-05

## 2025-01-05 RX ORDER — METOCLOPRAMIDE HYDROCHLORIDE 5 MG/ML
10 INJECTION INTRAMUSCULAR; INTRAVENOUS ONCE
Status: COMPLETED | OUTPATIENT
Start: 2025-01-05 | End: 2025-01-05

## 2025-01-05 RX ORDER — MECLIZINE HYDROCHLORIDE 25 MG/1
25 TABLET ORAL ONCE
Status: COMPLETED | OUTPATIENT
Start: 2025-01-05 | End: 2025-01-05

## 2025-01-05 NOTE — DISCHARGE INSTRUCTIONS
Continue to increase fluids and rest  Continue ibuprofen/Tylenol as needed for headaches  Can contempt to use meclizine continuance for dizziness  Take Augmentin twice a day until gone with close follow-up with your dentist  Close follow-up with your primary care doctor  Return to the ER symptoms worsen

## 2025-01-05 NOTE — ED PROVIDER NOTES
Patient Seen in: [facility] In [place]      History     Chief Complaint   Patient presents with    Dizziness     Stated Complaint: lightheaded since thursday    Subjective:   HPI      24-year-old female with past med history of anemia presents to the immediate care due to lightheadedness x 4 days.  Patient describes the sensation is overall feeling spacey generalized weakness.  The last few days has had episodes of room spinning however intermittently.  She endorsed a recent URI type symptoms for the past 2 weeks.  Still dealing with congestion.  Endorsing bilateral ear pain and pressure.  No drainage or muffled hearing.  Does have a history of sinuses however this feels slightly different.  Does have a headache intermittently on the right side no blurry vision.  The dizziness is not related to the headaches.  Had been taking ibuprofen and Tylenol with some relief.  She denies any chest pain, palpitations, shortness of breath.  Denies any recent abdominal pain nausea or vomiting or diarrhea however admits she has been lacking on her p.o. intake.  Does have a history of anemia but has not been compliant with her iron supplements.  Last menstrual cycle was 12/16 denies any chance of pregnancy.     Objective:     Past Medical History:    Allergic rhinitis    Cyclical vomiting              Past Surgical History:   Procedure Laterality Date    Colonoscopy  2021    Endoscopy, bowel pouch, biopsy                  Social History     Socioeconomic History    Marital status: Single   Tobacco Use    Smoking status: Never    Smokeless tobacco: Never   Vaping Use    Vaping status: Never Used   Substance and Sexual Activity    Alcohol use: Yes     Alcohol/week: 6.0 standard drinks of alcohol     Types: 6 Standard drinks or equivalent per week     Comment: Occasional drinking.    Drug use: Not Currently     Types: Cannabis     Comment: The last time I smoked weed was May 7th, 2024.   Other Topics Concern     Caffeine Concern No    Exercise No    Seat Belt No    Special Diet No    Stress Concern No    Weight Concern No                  Physical Exam     ED Triage Vitals [01/05/25 0732]   /81   Pulse 83   Resp 14   Temp 97.3 °F (36.3 °C)   Temp src Temporal   SpO2 97 %   O2 Device None (Room air)       Current Vitals:   Vital Signs  BP: 114/70  Pulse: 64  Resp: 14  Temp: 97.3 °F (36.3 °C)  Temp src: Temporal    Oxygen Therapy  SpO2: 97 %  O2 Device: None (Room air)        Physical Exam  Vitals and nursing note reviewed.   Constitutional:       General: She is not in acute distress.     Appearance: She is well-developed.   HENT:      Head: Normocephalic.      Right Ear: Tympanic membrane, ear canal and external ear normal.      Left Ear: Tympanic membrane, ear canal and external ear normal.      Ears:      Comments: No erythema.     Nose: Congestion present.      Mouth/Throat:      Mouth: Mucous membranes are moist.      Pharynx: No oropharyngeal exudate or posterior oropharyngeal erythema.   Eyes:      Extraocular Movements: Extraocular movements intact.      Right eye: Normal extraocular motion and no nystagmus.      Left eye: Normal extraocular motion and no nystagmus.      Pupils: Pupils are equal, round, and reactive to light.   Cardiovascular:      Rate and Rhythm: Regular rhythm.   Pulmonary:      Effort: Pulmonary effort is normal. No respiratory distress.      Breath sounds: Normal breath sounds.   Abdominal:      General: Bowel sounds are normal. There is no distension.      Palpations: Abdomen is soft.      Tenderness: There is no abdominal tenderness. There is no guarding.   Musculoskeletal:         General: Normal range of motion.      Cervical back: Normal range of motion.      Right lower leg: No edema.      Left lower leg: No edema.   Skin:     General: Skin is warm.   Neurological:      General: No focal deficit present.      Mental Status: She is alert and oriented to person, place, and time.       Cranial Nerves: No cranial nerve deficit.      Motor: No weakness.      Gait: Gait normal.   Psychiatric:         Mood and Affect: Mood normal.         Behavior: Behavior normal.             ED Course     Labs Reviewed   COMP METABOLIC PANEL (14) - Abnormal; Notable for the following components:       Result Value    Glucose 104 (*)     BUN 7 (*)     ALT 7 (*)     Bilirubin, Total 0.2 (*)     All other components within normal limits   POCT PREGNANCY URINE - Normal   CBC WITH DIFFERENTIAL WITH PLATELET     EKG    Rate, intervals and axes as noted on EKG Report.  Rate: 61 bpm  Rhythm: Sinus Rhythm with sinus arrhythmia, no STEMI   Reading: Normal sinus rhythm     ED Course as of 01/05/25 1036  ------------------------------------------------------------  Time: 01/05 0924  Comment: Patient has right-sided headaches, some photophobia along with her dizziness.  Additionally, the patient also has grossly decayed right lower molar which is tender to palpation.  Neurologic exam is unremarkable.  She has normal speech, normal gait, normal tandem walking and normal cognition.              MDM      Ddx-migraine, sinusitis, AOM, vertigo, anemia, ACS, CVA    On exam the patient is in no acute distress.  Her vital signs are stable including orthostatics.  No nystagmus is noted.  Bilateral TMs unremarkable.  She has noted to have a rotten lower molar no signs of dental abscess.  Her neuroexam is completely benign.  Patient denies any chest pain shortness of breath or nausea/vomiting. EKG performed showing normal sinus rhythm.  CBC, CMP, pregnancy negative for acute findings.  Clinically more consistent with migraine.  Patient was given Reglan Benadryl and meclizine with  improvement of her symptoms.  Discussed these findings with the patient who voiced understanding.  Due to the significant decay of her right lower molar patient was placed on Augmentin advised the need for close follow-up with her dentist.  The patient is  otherwise to have close follow-up with her primary care doctor.  Strict return precautions and the patient comfortable plan discharge home        Medical Decision Making  Problems Addressed:  Acute nonintractable headache, unspecified headache type: acute illness or injury  Dizziness: acute illness or injury  Tooth infection: acute illness or injury    Amount and/or Complexity of Data Reviewed  Labs: ordered. Decision-making details documented in ED Course.  ECG/medicine tests: ordered and independent interpretation performed. Decision-making details documented in ED Course.    Risk  OTC drugs.  Prescription drug management.        Disposition and Plan     Clinical Impression:  1. Acute nonintractable headache, unspecified headache type    2. Dizziness    3. Tooth infection         Disposition:  Discharge  1/5/2025 10:22 am    Follow-up:  Danya Lindsey,   [address]  [place] [address]  [phone]    Follow up            Medications Prescribed:  Discharge Medication List as of 1/5/2025 10:25 AM        START taking these medications    Details   amoxicillin clavulanate 875-125 MG Oral Tab Take 1 tablet by mouth 2 (two) times daily for 10 days., Normal, Disp-20 tablet, R-0      meclizine 25 MG Oral Tab Take 1 tablet (25 mg total) by mouth 3 (three) times daily as needed for Dizziness., Normal, Disp-12 tablet, R-0                 Supplementary Documentation:

## 2025-04-30 ENCOUNTER — LAB ENCOUNTER (OUTPATIENT)
Dept: LAB | Age: 25
End: 2025-04-30
Attending: INTERNAL MEDICINE
Payer: COMMERCIAL

## 2025-04-30 ENCOUNTER — OFFICE VISIT (OUTPATIENT)
Dept: INTERNAL MEDICINE CLINIC | Facility: CLINIC | Age: 25
End: 2025-04-30
Payer: COMMERCIAL

## 2025-04-30 VITALS
BODY MASS INDEX: 33.12 KG/M2 | HEIGHT: 67 IN | HEART RATE: 81 BPM | TEMPERATURE: 98 F | OXYGEN SATURATION: 98 % | WEIGHT: 211 LBS | RESPIRATION RATE: 14 BRPM | SYSTOLIC BLOOD PRESSURE: 118 MMHG | DIASTOLIC BLOOD PRESSURE: 80 MMHG

## 2025-04-30 DIAGNOSIS — Z11.3 SCREENING EXAMINATION FOR STD (SEXUALLY TRANSMITTED DISEASE): ICD-10-CM

## 2025-04-30 DIAGNOSIS — Z11.3 SCREENING EXAMINATION FOR STD (SEXUALLY TRANSMITTED DISEASE): Primary | ICD-10-CM

## 2025-04-30 PROBLEM — F12.288 CANNABIS HYPEREMESIS SYNDROME CONCURRENT WITH AND DUE TO CANNABIS DEPENDENCE (HCC): Status: ACTIVE | Noted: 2025-04-30

## 2025-04-30 LAB
HBV SURFACE AG SER-ACNC: 0.17 [IU]/L
HBV SURFACE AG SERPL QL IA: NONREACTIVE
HCV AB SERPL QL IA: NONREACTIVE
T PALLIDUM AB SER QL IA: NONREACTIVE

## 2025-04-30 PROCEDURE — 87491 CHLMYD TRACH DNA AMP PROBE: CPT

## 2025-04-30 PROCEDURE — 87591 N.GONORRHOEAE DNA AMP PROB: CPT

## 2025-04-30 PROCEDURE — 87801 DETECT AGNT MULT DNA AMPLI: CPT

## 2025-04-30 PROCEDURE — 87389 HIV-1 AG W/HIV-1&-2 AB AG IA: CPT

## 2025-04-30 PROCEDURE — 87340 HEPATITIS B SURFACE AG IA: CPT

## 2025-04-30 PROCEDURE — 99213 OFFICE O/P EST LOW 20 MIN: CPT | Performed by: INTERNAL MEDICINE

## 2025-04-30 PROCEDURE — 86780 TREPONEMA PALLIDUM: CPT

## 2025-04-30 PROCEDURE — 86803 HEPATITIS C AB TEST: CPT

## 2025-04-30 PROCEDURE — 36415 COLL VENOUS BLD VENIPUNCTURE: CPT

## 2025-04-30 NOTE — PROGRESS NOTES
Patient Office Visit    ASSESSMENT AND PLAN:   1. Screening examination for STD (sexually transmitted disease)  Note: screening ordered   - HIV AG AB Combo [E]; Future  - T Pallidum Screening Cascade [E]; Future  - Hepatitis B Surface Antigen [E]; Future  - HCV Antibody [E]; Future  - Chlamydia/Gc Amplification [E]; Future    RTC yearly for a physical       Patient/Caregiver Education: Patient/Caregiver Education: There are no barriers to learning. Medical education done. Outcome: Patient verbalizes understanding. Patient is notified to call with any questions, complications, allergies, or worsening or changing symptoms.  Patient is to call with any side effects or complications from the treatments as a result of today.      Reviewed Past Medical History and   Problem List[1]    Orders Placed This Encounter   Procedures    HIV AG AB Combo [E]     Standing Status:   Future     Expected Date:   4/30/2025     Expiration Date:   4/30/2026     Consent obtained?:   Yes     Release to patient:   Immediate    T Pallidum Screening [place] [E]     Standing Status:   Future     Expected Date:   4/30/2025     Expiration Date:   4/30/2026     Release to patient:   Immediate    Hepatitis B Surface Antigen [E]     Standing Status:   Future     Expected Date:   4/30/2025     Expiration Date:   4/30/2026     Release to patient:   Immediate    HCV Antibody [E]     Standing Status:   Future     Expected Date:   4/30/2025     Expiration Date:   4/30/2026     Release to patient:   Immediate     Requested Prescriptions      No prescriptions requested or ordered in this encounter         Danya Lindsey DO  CC:  Follow up        HPI:   Sheree Olivares is a 25-year-old female who presents for STD screening.  She has no other questions or concerns    Past Medical History[2]    Past Surgical History[3]    Social History:  Short Social Hx on File[4]  Family History:  Family History[5]  Allergies:  Allergies[6]  Current  Meds:  Medications Ordered Prior to Encounter[7]      REVIEW OF SYSTEMS   Constitutional: no fatigue normal energy no weight changes   HENT: normal sinuses and no mouth issues   Eyes: . normal vision no eye pain   Respiratory: normal respirations no cough   Cardiovascular: no CP, or palpitations   Gastrointestinal: normal bowels and no abd pains   Genitourinary:  normal urination no hematuria, no frequency   Musculoskeletal: no pains in arms/legs, normal range of motion   Skin: no rashes or skin lesions that are new   Neurological:  no weakness, no numbness, normal gait   Hematological:  no bruises or bleeding   Psychiatric/Behavioral: normal mood no anxiety normal behavior     /80 (BP Location: Right arm, Patient Position: Sitting, Cuff Size: adult)   Pulse 81   Temp 97.6 °F (36.4 °C) (Temporal)   Resp 14   Ht 5' 7\" (1.702 m)   Wt 211 lb (95.7 kg)   LMP 04/08/2025 (Exact Date)   SpO2 98%   BMI 33.05 kg/m²     PHYSICAL EXAM:   Constitutional: Vital signs reviewed as noted, well developed, in no acute distress.   HENT: NCAT  Eyes: pupils reactive bilaterally  Cardiovascular: nl s1 s2 no m/r/g  Pulmonary/Chest: CTA bilaterally with no wheezes  Extremities: no pedal edema   Neurological:  no weakness in UE and LE, reflexes are normal  Skin: no rashes or bruises on visualized skin, not undressed   Psychiatric:normal mood              [1]   Patient Active Problem List  Diagnosis    Cannabis hyperemesis syndrome concurrent with and due to cannabis dependence (HCC)   [2]   Past Medical History:   Allergic rhinitis    Back pain    Body piercing    Cyclical vomiting    Decorative tattoo    Easy bruising    Wears glasses   [3]   Past Surgical History:  Procedure Laterality Date    Colonoscopy  2021    Egd  March 26, 2021    Endoscopy, bowel pouch, biopsy     [4]   Social History  Socioeconomic History    Marital status: Single   Tobacco Use    Smoking status: Never    Smokeless tobacco: Never   Vaping Use     Vaping status: Never Used   Substance and Sexual Activity    Alcohol use: Yes     Alcohol/week: 6.0 standard drinks of alcohol     Types: 6 Standard drinks or equivalent per week     Comment: Occasional drinking.    Drug use: Not Currently     Types: Cannabis     Comment: The last time I smoked weed was May 7th, 2024.   Other Topics Concern    Caffeine Concern No    Exercise Yes    Seat Belt No    Special Diet No    Stress Concern No    Weight Concern Yes     Social Drivers of Health     Food Insecurity: No Food Insecurity (4/30/2025)    NCSS - Food Insecurity     Worried About Running Out of Food in the Last Year: No     Ran Out of Food in the Last Year: No   Transportation Needs: No Transportation Needs (4/30/2025)    NCSS - Transportation     Lack of Transportation: No   Housing Stability: Not At Risk (4/30/2025)    NCSS - Housing/Utilities     Has Housing: Yes     Worried About Losing Housing: No     Unable to Get Utilities: No   [5]   Family History  Problem Relation Age of Onset    Diabetes Father     Other (Other) Mother         multiple sclerosis    Cancer Maternal Grandmother         breast cancer    Breast Cancer Maternal Grandmother     No Known Problems Maternal Grandfather     No Known Problems Brother     Heart Disorder Neg     Stroke Neg    [6] No Known Allergies  [7]   Current Outpatient Medications on File Prior to Visit   Medication Sig Dispense Refill    meclizine 25 MG Oral Tab Take 1 tablet (25 mg total) by mouth 3 (three) times daily as needed for Dizziness. (Patient not taking: Reported on 4/30/2025) 12 tablet 0    Etonogestrel-Ethinyl Estradiol (NUVARING) 0.12-0.015 MG/24HR Vaginal Ring Place 1 Ring vaginally every 21 days. (Patient not taking: Reported on 4/30/2025) 3 Ring 1     No current facility-administered medications on file prior to visit.

## 2025-05-01 LAB
C TRACH DNA SPEC QL NAA+PROBE: NEGATIVE
N GONORRHOEA DNA SPEC QL NAA+PROBE: NEGATIVE

## 2025-07-08 ENCOUNTER — LAB ENCOUNTER (OUTPATIENT)
Dept: LAB | Age: 25
End: 2025-07-08
Attending: INTERNAL MEDICINE
Payer: COMMERCIAL

## 2025-07-08 ENCOUNTER — OFFICE VISIT (OUTPATIENT)
Dept: INTERNAL MEDICINE CLINIC | Facility: CLINIC | Age: 25
End: 2025-07-08
Payer: COMMERCIAL

## 2025-07-08 VITALS
SYSTOLIC BLOOD PRESSURE: 102 MMHG | BODY MASS INDEX: 33.43 KG/M2 | WEIGHT: 213 LBS | DIASTOLIC BLOOD PRESSURE: 60 MMHG | RESPIRATION RATE: 16 BRPM | TEMPERATURE: 97 F | HEIGHT: 67 IN | OXYGEN SATURATION: 98 % | HEART RATE: 76 BPM

## 2025-07-08 DIAGNOSIS — Z11.3 SCREENING EXAMINATION FOR STD (SEXUALLY TRANSMITTED DISEASE): ICD-10-CM

## 2025-07-08 DIAGNOSIS — N92.6 MISSED PERIOD: ICD-10-CM

## 2025-07-08 DIAGNOSIS — Z11.3 SCREENING EXAMINATION FOR STD (SEXUALLY TRANSMITTED DISEASE): Primary | ICD-10-CM

## 2025-07-08 LAB
CONTROL LINE PRESENT WITH A CLEAR BACKGROUND (YES/NO): YES YES/NO
HBV SURFACE AG SER-ACNC: 0.25 [IU]/L
HBV SURFACE AG SERPL QL IA: NONREACTIVE
HCV AB SERPL QL IA: NONREACTIVE
KIT LOT #: NORMAL NUMERIC
T PALLIDUM AB SER QL IA: NONREACTIVE

## 2025-07-08 PROCEDURE — 87340 HEPATITIS B SURFACE AG IA: CPT

## 2025-07-08 PROCEDURE — 36415 COLL VENOUS BLD VENIPUNCTURE: CPT

## 2025-07-08 PROCEDURE — 99213 OFFICE O/P EST LOW 20 MIN: CPT | Performed by: INTERNAL MEDICINE

## 2025-07-08 PROCEDURE — 86803 HEPATITIS C AB TEST: CPT

## 2025-07-08 PROCEDURE — 86780 TREPONEMA PALLIDUM: CPT

## 2025-07-08 PROCEDURE — 81025 URINE PREGNANCY TEST: CPT | Performed by: INTERNAL MEDICINE

## 2025-07-08 PROCEDURE — 87389 HIV-1 AG W/HIV-1&-2 AB AG IA: CPT

## 2025-07-08 NOTE — PROGRESS NOTES
Patient Office Visit    ASSESSMENT AND PLAN:   1. Screening examination for STD (sexually transmitted disease)  - HIV AG AB Combo [E]; Future  - Hepatitis B Surface Antigen [E]; Future  - HCV Antibody [E]; Future  - T Pallidum Screening Cascade [E]; Future  - Chlamydia/Gc Amplification [E]; Future    2. Missed period  - Urine Preg Test    Return to clinic yearly for routine physical      Patient/Caregiver Education: Patient/Caregiver Education: There are no barriers to learning. Medical education done. Outcome: Patient verbalizes understanding. Patient is notified to call with any questions, complications, allergies, or worsening or changing symptoms.  Patient is to call with any side effects or complications from the treatments as a result of today.      Reviewed Past Medical History and   Problem List[1]    Orders Placed This Encounter   Procedures    HIV AG AB Combo [E]     Standing Status:   Future     Number of Occurrences:   1     Expected Date:   7/8/2025     Expiration Date:   7/8/2026     Consent obtained?:   Yes     Release to patient:   Immediate    Hepatitis B Surface Antigen [E]     Standing Status:   Future     Number of Occurrences:   1     Expected Date:   7/8/2025     Expiration Date:   7/8/2026     Release to patient:   Immediate    HCV Antibody [E]     Standing Status:   Future     Number of Occurrences:   1     Expected Date:   7/8/2025     Expiration Date:   7/8/2026     Release to patient:   Immediate    T Pallidum Screening Nelson [E]     Standing Status:   Future     Number of Occurrences:   1     Expected Date:   7/8/2025     Expiration Date:   7/8/2026     Release to patient:   Immediate    Urine Preg Test     Release to patient:   Immediate     Requested Prescriptions      No prescriptions requested or ordered in this encounter         Danya Lindsey DO  CC:  Chief Complaint   Patient presents with    Follow - Up         HPI:   Sheree Olivares is a 25-year-old female who  presents for a follow-up.  She is just requesting STD screening.  Eye twitching has resolved    Past Medical History[2]    Past Surgical History[3]    Social History:  Short Social Hx on File[4]  Family History:  Family History[5]  Allergies:  Allergies[6]  Current Meds:  Medications Ordered Prior to Encounter[7]      REVIEW OF SYSTEMS   Constitutional: no fatigue normal energy no weight changes   HENT: normal sinuses and no mouth issues   Eyes: . normal vision no eye pain   Respiratory: normal respirations no cough   Cardiovascular: no CP, or palpitations   Gastrointestinal: normal bowels and no abd pains   Genitourinary:  normal urination no hematuria, no frequency   Musculoskeletal: no pains in arms/legs, normal range of motion   Skin: no rashes or skin lesions that are new   Neurological:  no weakness, no numbness, normal gait   Hematological:  no bruises or bleeding   Psychiatric/Behavioral: normal mood no anxiety normal behavior     /60 (BP Location: Right arm, Patient Position: Sitting, Cuff Size: adult)   Pulse 76   Temp 97 °F (36.1 °C) (Temporal)   Resp 16   Ht 5' 7\" (1.702 m)   Wt 213 lb (96.6 kg)   LMP 06/11/2025 (Exact Date)   SpO2 98%   BMI 33.36 kg/m²     PHYSICAL EXAM:   Constitutional: Vital signs reviewed as noted, well developed, in no acute distress.   HENT: NCAT  Eyes: pupils reactive bilaterally  Cardiovascular: nl s1 s2 no m/r/g  Pulmonary/Chest: CTA bilaterally with no wheezes  Extremities: no pedal edema   Neurological:  no weakness in UE and LE, reflexes are normal  Skin: no rashes or bruises on visualized skin, not undressed   Psychiatric:normal mood              [1]   Patient Active Problem List  Diagnosis    Cannabis hyperemesis syndrome concurrent with and due to cannabis dependence (HCC)   [2]   Past Medical History:   Allergic rhinitis    Back pain    Body piercing    Cyclical vomiting    Decorative tattoo    Easy bruising    Wears glasses   [3]   Past Surgical  History:  Procedure Laterality Date    Colonoscopy  2021    Egd  March 26, 2021    Endoscopy, bowel pouch, biopsy     [4]   Social History  Socioeconomic History    Marital status: Single   Tobacco Use    Smoking status: Never    Smokeless tobacco: Never   Vaping Use    Vaping status: Never Used   Substance and Sexual Activity    Alcohol use: Yes     Alcohol/week: 6.0 standard drinks of alcohol     Types: 6 Standard drinks or equivalent per week     Comment: Occasional drinking.    Drug use: Not Currently     Types: Cannabis     Comment: The last time I smoked weed was May 7th, 2024.   Other Topics Concern    Caffeine Concern No    Exercise Yes    Seat Belt No    Special Diet No    Stress Concern No    Weight Concern Yes     Social Drivers of Health     Food Insecurity: No Food Insecurity (4/30/2025)    NCSS - Food Insecurity     Worried About Running Out of Food in the Last Year: No     Ran Out of Food in the Last Year: No   Transportation Needs: No Transportation Needs (4/30/2025)    NCSS - Transportation     Lack of Transportation: No   Housing Stability: Not At Risk (4/30/2025)    NCSS - Housing/Utilities     Has Housing: Yes     Worried About Losing Housing: No     Unable to Get Utilities: No   [5]   Family History  Problem Relation Age of Onset    Diabetes Father     Other (Other) Mother         multiple sclerosis    Cancer Maternal Grandmother         breast cancer    Breast Cancer Maternal Grandmother     No Known Problems Maternal Grandfather     No Known Problems Brother     Heart Disorder Neg     Stroke Neg    [6] No Known Allergies  [7]   No current outpatient medications on file prior to visit.     No current facility-administered medications on file prior to visit.

## 2025-08-30 ENCOUNTER — LAB ENCOUNTER (OUTPATIENT)
Dept: LAB | Age: 25
End: 2025-08-30
Attending: INTERNAL MEDICINE

## 2025-08-30 DIAGNOSIS — Z11.3 SCREENING EXAMINATION FOR STD (SEXUALLY TRANSMITTED DISEASE): ICD-10-CM

## 2025-08-30 LAB
HBV SURFACE AG SER-ACNC: <0.1
HBV SURFACE AG SERPL QL IA: NONREACTIVE
HCG UR QL: NEGATIVE
HCV AB SERPL QL IA: NONREACTIVE
T PALLIDUM AB SER QL IA: NONREACTIVE

## 2025-08-30 PROCEDURE — 36415 COLL VENOUS BLD VENIPUNCTURE: CPT

## 2025-08-30 PROCEDURE — 87491 CHLMYD TRACH DNA AMP PROBE: CPT

## 2025-08-30 PROCEDURE — 87389 HIV-1 AG W/HIV-1&-2 AB AG IA: CPT

## 2025-08-30 PROCEDURE — 87340 HEPATITIS B SURFACE AG IA: CPT

## 2025-08-30 PROCEDURE — 86780 TREPONEMA PALLIDUM: CPT

## 2025-08-30 PROCEDURE — 87591 N.GONORRHOEAE DNA AMP PROB: CPT

## 2025-08-30 PROCEDURE — 86803 HEPATITIS C AB TEST: CPT

## 2025-08-30 PROCEDURE — 81025 URINE PREGNANCY TEST: CPT

## (undated) NOTE — LETTER
Date & Time: 12/22/2023, 12:12 PM  Patient: Joann Negrete  Encounter Provider(s):    VERONICA Dickinson       To Whom It May Concern:    Joann Negrete was seen and treated in our department on 12/22/23. Please excuse her from work until 12/26/23 when she can return if without fever (<100.4) and if feeling better. If you have any questions or concerns, please do not hesitate to call.        ________________________________________________  Julio Basurto.  ROSY Johns, VERONICA, AGACNP-BC, FNP-C, CNL  Adult-Gerontology Acute Care & Family Nurse Practitioner

## (undated) NOTE — LETTER
Date & Time: 1/5/2025, 10:22 AM  Patient: Sheree Olivares  Encounter Provider(s):    Tanvir Grigsby MD Evans, KANA Bazzi       To Whom It May Concern:    Sheree Olivares was seen and treated in our department on 1/5/2025. She should not return to work until 01/07/25 .    If you have any questions or concerns, please do not hesitate to call.        _____________________________  Physician/APC Signature